# Patient Record
Sex: FEMALE | Race: WHITE | NOT HISPANIC OR LATINO | Employment: FULL TIME | URBAN - METROPOLITAN AREA
[De-identification: names, ages, dates, MRNs, and addresses within clinical notes are randomized per-mention and may not be internally consistent; named-entity substitution may affect disease eponyms.]

---

## 2021-01-04 LAB
EXTERNAL CHLAMYDIA SCREEN: NEGATIVE
EXTERNAL GONORRHEA SCREEN: NEGATIVE

## 2021-01-11 LAB
EXTERNAL ABO GROUPING: NORMAL
EXTERNAL ANTIBODY SCREEN: NORMAL
EXTERNAL HEMOGLOBIN: 14.4 G/DL
EXTERNAL HEPATITIS B SURFACE ANTIGEN: NEGATIVE
EXTERNAL HIV-1/2 AB-AG: NORMAL
EXTERNAL PLATELET COUNT: 271 K/ΜL
EXTERNAL RH FACTOR: POSITIVE
EXTERNAL RUBELLA IGG QUANTITATION: NORMAL
EXTERNAL SYPHILIS RPR SCREEN: NORMAL

## 2021-04-28 RX ORDER — LEVOTHYROXINE SODIUM 50 UG/1
1 CAPSULE ORAL DAILY
COMMUNITY

## 2021-04-29 ENCOUNTER — ROUTINE PRENATAL (OUTPATIENT)
Dept: OBGYN CLINIC | Facility: CLINIC | Age: 31
End: 2021-04-29

## 2021-04-29 VITALS
SYSTOLIC BLOOD PRESSURE: 114 MMHG | HEIGHT: 64 IN | BODY MASS INDEX: 25.44 KG/M2 | DIASTOLIC BLOOD PRESSURE: 74 MMHG | WEIGHT: 149 LBS

## 2021-04-29 DIAGNOSIS — Z34.92 PRENATAL CARE IN SECOND TRIMESTER: ICD-10-CM

## 2021-04-29 DIAGNOSIS — Z3A.24 24 WEEKS GESTATION OF PREGNANCY: Primary | ICD-10-CM

## 2021-04-29 DIAGNOSIS — Z36.0 ENCOUNTER FOR ANTENATAL SCREENING FOR CHROMOSOMAL ANOMALIES: ICD-10-CM

## 2021-04-29 DIAGNOSIS — E03.9 ACQUIRED HYPOTHYROIDISM: ICD-10-CM

## 2021-04-29 LAB
SL AMB  POCT GLUCOSE, UA: NORMAL
SL AMB POCT URINE PROTEIN: NORMAL

## 2021-04-29 PROCEDURE — PNV: Performed by: OBSTETRICS & GYNECOLOGY

## 2021-04-29 NOTE — PROGRESS NOTES
Routine Prenatal Visit  [address] [facility], [address], [facility], [address] [facility]    Assessment/Plan:  Soledad James is a 31y o  year old  at 25w3d who presents for routine prenatal visit  1  24 weeks gestation of pregnancy  -     POCT urine dip    2  Encounter for  screening for chromosomal anomalies  -     Glucose, 1H PG; Future  -     CBC; Future  -     RPR, Rfx Qn RPR/Confirm TP; Future  -     Glucose, 1H PG  -     CBC  -     RPR, Rfx Qn RPR/Confirm TP    3  Prenatal care in second trimester   Pt aware of 20 wk U/S results    4  Acquired hypothyroidism  Assessment & Plan:  Pt managed by Endocrinologist          Subjective:     CC: Prenatal care    Patt Nguyen is a 31 y o   female who presents for routine prenatal care at 24w1d  Pregnancy ROS: No leakage of fluid, pelvic pain, or vaginal bleeding  Nml fetal movement  The following portions of the patient's history were reviewed and updated as appropriate: allergies, current medications, past family history, past medical history, obstetric history, gynecologic history, past social history, past surgical history and problem list       Objective:  /74   Ht 5' 4" (1 626 m)   Wt 67 6 kg (149 lb)   LMP 2020   BMI 25 58 kg/m²   Pregravid Weight/BMI: Pregravid weight not on file (BMI Could not be calculated)  Current Weight: 67 6 kg (149 lb)   Total Weight Gain: Not found  Pre-David Vitals      Most Recent Value   Prenatal Assessment   Fetal Heart Rate  150   Fundal Height (cm)  24 cm   Movement  Present   Prenatal Vitals   Blood Pressure  114/74   Weight - Scale  67 6 kg (149 lb)   Urine Albumin/Glucose   Dilation/Effacement/Station   Vaginal Drainage   Draining Fluid  No   Edema           General: Well appearing, no distress  Abdomen: Soft, gravid, nontender  Fundal Height: Appropriate for gestational age  Extremities: Warm and well perfused  Non tender

## 2021-05-27 ENCOUNTER — ROUTINE PRENATAL (OUTPATIENT)
Dept: OBGYN CLINIC | Facility: CLINIC | Age: 31
End: 2021-05-27

## 2021-05-27 VITALS
SYSTOLIC BLOOD PRESSURE: 100 MMHG | WEIGHT: 154 LBS | BODY MASS INDEX: 26.29 KG/M2 | HEIGHT: 64 IN | DIASTOLIC BLOOD PRESSURE: 60 MMHG

## 2021-05-27 DIAGNOSIS — E03.9 ACQUIRED HYPOTHYROIDISM: ICD-10-CM

## 2021-05-27 DIAGNOSIS — Z3A.28 28 WEEKS GESTATION OF PREGNANCY: Primary | ICD-10-CM

## 2021-05-27 DIAGNOSIS — Z34.93 PRENATAL CARE IN THIRD TRIMESTER: ICD-10-CM

## 2021-05-27 LAB
SL AMB  POCT GLUCOSE, UA: NORMAL
SL AMB POCT URINE PROTEIN: NORMAL

## 2021-05-27 PROCEDURE — PNV: Performed by: OBSTETRICS & GYNECOLOGY

## 2021-05-27 NOTE — PROGRESS NOTES
Routine Prenatal Visit  [address] [facility], [address], [facility], [address] [facility]    Assessment/Plan:  Demetrius Peña is a 31y o  year old  at 29w1d who presents for routine prenatal visit  1  28 weeks gestation of pregnancy  -     POCT urine dip        -   Pt has order for 28 week labs and is scheduled for this week  2  Acquired hypothyroidism    3  Prenatal care in third trimester       -   Pt received 1st dose of COVID-19 vaccine last week  Next OB Visit 2 weeks  Subjective:     CC: Prenatal care    Luis Cade is a 31 y o   female who presents for routine prenatal care at 28w1d  Pregnancy ROS: No leakage of fluid, pelvic pain, or vaginal bleeding  Nml fetal movement  The following portions of the patient's history were reviewed and updated as appropriate: allergies, current medications, past family history, past medical history, obstetric history, gynecologic history, past social history, past surgical history and problem list       Objective:  /60   Ht 5' 4" (1 626 m)   Wt 69 9 kg (154 lb)   LMP 2020   BMI 26 43 kg/m²   Pregravid Weight/BMI: Pregravid weight not on file (BMI Could not be calculated)  Current Weight: 69 9 kg (154 lb)   Total Weight Gain: Not found  Pre-David Vitals      Most Recent Value   Prenatal Assessment   Fetal Heart Rate  140-150   Fundal Height (cm)  28 cm   Movement  Present   Prenatal Vitals   Blood Pressure  100/60   Weight - Scale  69 9 kg (154 lb)   Urine Albumin/Glucose   Dilation/Effacement/Station   Vaginal Drainage   Draining Fluid  No   Edema           General: Well appearing, no distress  Abdomen: Soft, gravid, nontender  Fundal Height: Appropriate for gestational age  Extremities: Warm and well perfused  Non tender

## 2021-06-01 LAB
EXTERNAL HEMOGLOBIN: 13 G/DL
EXTERNAL PLATELET COUNT: 282 K/ΜL
EXTERNAL SYPHILIS RPR SCREEN: NORMAL

## 2021-06-02 LAB
ERYTHROCYTE [DISTWIDTH] IN BLOOD BY AUTOMATED COUNT: 12.1 % (ref 11.7–15.4)
GLUCOSE 1H P 50 G GLC PO SERPL-MCNC: 88 MG/DL (ref 65–139)
HCT VFR BLD AUTO: 37.7 % (ref 34–46.6)
HGB BLD-MCNC: 13 G/DL (ref 11.1–15.9)
MCH RBC QN AUTO: 32.7 PG (ref 26.6–33)
MCHC RBC AUTO-ENTMCNC: 34.5 G/DL (ref 31.5–35.7)
MCV RBC AUTO: 95 FL (ref 79–97)
PLATELET # BLD AUTO: 282 X10E3/UL (ref 150–450)
RBC # BLD AUTO: 3.98 X10E6/UL (ref 3.77–5.28)
RPR SER QL: NON REACTIVE
WBC # BLD AUTO: 8.6 X10E3/UL (ref 3.4–10.8)

## 2021-06-09 ENCOUNTER — ROUTINE PRENATAL (OUTPATIENT)
Dept: OBGYN CLINIC | Facility: CLINIC | Age: 31
End: 2021-06-09

## 2021-06-09 VITALS
SYSTOLIC BLOOD PRESSURE: 114 MMHG | DIASTOLIC BLOOD PRESSURE: 74 MMHG | BODY MASS INDEX: 26.66 KG/M2 | HEIGHT: 65 IN | WEIGHT: 160 LBS

## 2021-06-09 DIAGNOSIS — O26.893 LOW BACK PAIN DURING PREGNANCY IN THIRD TRIMESTER: Primary | ICD-10-CM

## 2021-06-09 DIAGNOSIS — Z3A.30 30 WEEKS GESTATION OF PREGNANCY: ICD-10-CM

## 2021-06-09 DIAGNOSIS — M54.50 LOW BACK PAIN DURING PREGNANCY IN THIRD TRIMESTER: Primary | ICD-10-CM

## 2021-06-09 LAB
SL AMB  POCT GLUCOSE, UA: NORMAL
SL AMB POCT URINE PROTEIN: NORMAL

## 2021-06-09 PROCEDURE — PNV: Performed by: OBSTETRICS & GYNECOLOGY

## 2021-06-09 NOTE — PROGRESS NOTES
Routine Prenatal Visit  [address] [facility], [address], [facility], [address] [facility]    Assessment/Plan:  Taiwo Villarreal is a 27y o  year old  at 30w0d who presents for routine prenatal visit  1  Low back pain during pregnancy in third trimester  Assessment & Plan:  Discussed sciatic pain and tx - stretching, massage, heat and rest       2  30 weeks gestation of pregnancy  -     POCT urine dip        Subjective:     CC: Prenatal care    Yesika Valencia is a 27 y o   female who presents for routine prenatal care at 30w0d  Pregnancy ROS: no leakage of fluid, pelvic pain, or vaginal bleeding  normal fetal movement  The following portions of the patient's history were reviewed and updated as appropriate: allergies, current medications, past family history, past medical history, obstetric history, gynecologic history, past social history, past surgical history and problem list       Objective:  /74   Ht 5' 5" (1 651 m)   Wt 72 6 kg (160 lb)   LMP 2020   BMI 26 63 kg/m²   Pregravid Weight/BMI: Pregravid weight not on file (BMI Could not be calculated)  Current Weight: 72 6 kg (160 lb)   Total Weight Gain: Not found  Pre- Vitals      Most Recent Value   Prenatal Assessment   Fetal Heart Rate  140   Fundal Height (cm)  30 cm   Movement  Present   Presentation  Vertex   Prenatal Vitals   Blood Pressure  114/74   Weight - Scale  72 6 kg (160 lb)   Urine Albumin/Glucose   Dilation/Effacement/Station   Vaginal Drainage   Edema           General: Well appearing, no distress  Respiratory: Unlabored breathing  Cardiovascular: Regular rate  Abdomen: Soft, gravid, nontender  Fundal Height: Appropriate for gestational age  Extremities: Warm and well perfused  Non tender

## 2021-06-14 ENCOUNTER — TELEPHONE (OUTPATIENT)
Dept: OBGYN CLINIC | Facility: CLINIC | Age: 31
End: 2021-06-14

## 2021-06-14 NOTE — TELEPHONE ENCOUNTER
Returned call to patient, advised no specific chiropractor recommendation  Advised if decides to see chiropractor advise of pregnancy and confiirm provider has been trained with OB patients  May also want to consider SI belt or pregnancy support belt  Patient in agreement

## 2021-06-14 NOTE — TELEPHONE ENCOUNTER
Nimco boyle asking for recommendations for chiropractor  Having sciatic pain which is making it difficult to walk

## 2021-06-23 ENCOUNTER — ROUTINE PRENATAL (OUTPATIENT)
Dept: OBGYN CLINIC | Facility: CLINIC | Age: 31
End: 2021-06-23

## 2021-06-23 VITALS
WEIGHT: 158 LBS | DIASTOLIC BLOOD PRESSURE: 64 MMHG | HEIGHT: 64 IN | SYSTOLIC BLOOD PRESSURE: 104 MMHG | BODY MASS INDEX: 26.98 KG/M2

## 2021-06-23 DIAGNOSIS — M54.50 LOW BACK PAIN DURING PREGNANCY IN THIRD TRIMESTER: Primary | ICD-10-CM

## 2021-06-23 DIAGNOSIS — O26.893 LOW BACK PAIN DURING PREGNANCY IN THIRD TRIMESTER: Primary | ICD-10-CM

## 2021-06-23 DIAGNOSIS — E03.9 ACQUIRED HYPOTHYROIDISM: ICD-10-CM

## 2021-06-23 DIAGNOSIS — Z3A.32 32 WEEKS GESTATION OF PREGNANCY: ICD-10-CM

## 2021-06-23 LAB
SL AMB  POCT GLUCOSE, UA: NORMAL
SL AMB POCT URINE PROTEIN: NORMAL

## 2021-06-23 PROCEDURE — PNV: Performed by: OBSTETRICS & GYNECOLOGY

## 2021-06-23 NOTE — PROGRESS NOTES
Routine Prenatal Visit  [address] [facility], [address], [facility], [address] [facility] [address]    Assessment/Plan:  Soledad James is a 27y o  year old  at 32w0d who presents for routine prenatal visit  2nd COVID shot was 6/10/21  Rec waiting until next visit for TDAP    1  Low back pain during pregnancy in third trimester  Assessment & Plan:  Right sided sciatic pain  Using heat with some improvement  Going for massage      2  32 weeks gestation of pregnancy  -     POCT urine dip    3  Acquired hypothyroidism        Subjective:     CC: Prenatal care    Clara Hernandez is a 27 y o   female who presents for routine prenatal care at 32w0d  Pregnancy ROS: no leakage of fluid, pelvic pain, or vaginal bleeding  normal fetal movement  The following portions of the patient's history were reviewed and updated as appropriate: allergies, current medications, past family history, past medical history, obstetric history, gynecologic history, past social history, past surgical history and problem list       Objective:  /64   Ht 5' 4" (1 626 m)   Wt 71 7 kg (158 lb)   LMP 2020   BMI 27 12 kg/m²   Pregravid Weight/BMI: Pregravid weight not on file (BMI Could not be calculated)  Current Weight: 71 7 kg (158 lb)   Total Weight Gain: Not found  Pre- Vitals      Most Recent Value   Prenatal Assessment   Fetal Heart Rate  140   Fundal Height (cm)  32 cm   Movement  Present   Presentation  Vertex   Prenatal Vitals   Blood Pressure  104/64   Weight - Scale  71 7 kg (158 lb)   Urine Albumin/Glucose   Dilation/Effacement/Station   Vaginal Drainage   Draining Fluid  No   Edema   LLE Edema  None   RLE Edema  None           General: Well appearing, no distress  Respiratory: Unlabored breathing  Cardiovascular: Regular rate  Abdomen: Soft, gravid, nontender  Fundal Height: Appropriate for gestational age  Extremities: Warm and well perfused  Non tender

## 2021-07-09 ENCOUNTER — ROUTINE PRENATAL (OUTPATIENT)
Dept: OBGYN CLINIC | Facility: CLINIC | Age: 31
End: 2021-07-09
Payer: COMMERCIAL

## 2021-07-09 VITALS
WEIGHT: 163 LBS | HEIGHT: 64 IN | DIASTOLIC BLOOD PRESSURE: 70 MMHG | BODY MASS INDEX: 27.83 KG/M2 | SYSTOLIC BLOOD PRESSURE: 110 MMHG

## 2021-07-09 DIAGNOSIS — Z23 NEED FOR TDAP VACCINATION: ICD-10-CM

## 2021-07-09 DIAGNOSIS — Z3A.34 34 WEEKS GESTATION OF PREGNANCY: ICD-10-CM

## 2021-07-09 DIAGNOSIS — O99.283 HYPOTHYROID IN PREGNANCY, ANTEPARTUM, THIRD TRIMESTER: Primary | ICD-10-CM

## 2021-07-09 DIAGNOSIS — E03.9 HYPOTHYROID IN PREGNANCY, ANTEPARTUM, THIRD TRIMESTER: Primary | ICD-10-CM

## 2021-07-09 LAB
SL AMB  POCT GLUCOSE, UA: NORMAL
SL AMB POCT URINE PROTEIN: NORMAL

## 2021-07-09 PROCEDURE — PNV: Performed by: STUDENT IN AN ORGANIZED HEALTH CARE EDUCATION/TRAINING PROGRAM

## 2021-07-09 PROCEDURE — 90471 IMMUNIZATION ADMIN: CPT | Performed by: STUDENT IN AN ORGANIZED HEALTH CARE EDUCATION/TRAINING PROGRAM

## 2021-07-09 PROCEDURE — 90715 TDAP VACCINE 7 YRS/> IM: CPT | Performed by: STUDENT IN AN ORGANIZED HEALTH CARE EDUCATION/TRAINING PROGRAM

## 2021-07-09 NOTE — PROGRESS NOTES
Routine Prenatal Visit  [address] [facility], [address], [facility], [address] [facility]    Assessment/Plan:  Yonathan Shay is a 27y o  year old  at 35w2d who presents for routine prenatal visit  1  Hypothyroid in pregnancy, antepartum, third trimester  Assessment & Plan:  - PTL/PPROM/Bleeding precautions given  Kick counts reviewed  - Third trimester labs reviewed  - TDaP administered today  - Reviewed plan for collection of GBS swab at 36 weeks  - Problem list updated  - PMH, PSH, medications reviewed and updated as needed  - Return to office in 2 wk(s) for routine prenatal care        2  34 weeks gestation of pregnancy  -     POCT urine dip    3  Need for Tdap vaccination  -     Tdap Vaccine greater than or equal to 8yo        Subjective:   Lyndon Staples is a 27 y o   who presents for routine prenatal care at 34w2d  Complaints today: No  LOF: No; VB: No; Contractions: No; FM: Present and subjectively normal    Objective:  /70 (BP Location: Left arm, Patient Position: Sitting, Cuff Size: Standard)   Ht 5' 4" (1 626 m)   Wt 73 9 kg (163 lb)   LMP 2020   BMI 27 98 kg/m²     General: Well appearing, no distress  Respiratory: Unlabored breathing  Cardiovascular: Regular rate  Abdomen: Soft, gravid, nontender  Extremities: Warm and well perfused  Non tender  Pregravid Weight/BMI: Pregravid weight not on file (BMI Could not be calculated)  Current Weight: 73 9 kg (163 lb)   Total Weight Gain: Not found       Pre- Vitals      Most Recent Value   Prenatal Assessment   Fetal Heart Rate  140   Fundal Height (cm)  34 cm   Movement  Present   Presentation  Vertex   Prenatal Vitals   Blood Pressure  110/70   Weight - Scale  73 9 kg (163 lb)   Urine Albumin/Glucose   Dilation/Effacement/Station   Vaginal Drainage   Draining Fluid  No   Edema   LLE Edema  None   RLE Edema  None   Facial Edema  None           Gerson Hall MD  2021 3:06 PM

## 2021-07-09 NOTE — PATIENT INSTRUCTIONS
Pregnancy at 31 to 34 Weeks   AMBULATORY CARE:   What changes are happening to your body: You may continue to have symptoms such as shortness of breath, heartburn, contractions, or swelling of your ankles and feet  You may be gaining about 1 pound a week now  Seek care immediately if:   · You develop a severe headache that does not go away  · You have new or increased vision changes, such as blurred or spotted vision  · You have new or increased swelling in your face or hands  · You have vaginal spotting or bleeding  · Your water broke or you feel warm water gushing or trickling from your vagina  Contact your healthcare provider if:   · You have more than 5 contractions in 1 hour  · You notice any changes in your baby's movements  · You have abdominal cramps, pressure, or tightening  · You have a change in vaginal discharge  · You have chills or a fever  · You have vaginal itching, burning, or pain  · You have yellow, green, white, or foul-smelling vaginal discharge  · You have pain or burning when you urinate, less urine than usual, or pink or bloody urine  · You have questions or concerns about your condition or care  How to care for yourself at this stage of your pregnancy:   · Eat a variety of healthy foods  Healthy foods include fruits, vegetables, whole-grain breads, low-fat dairy foods, beans, lean meats, and fish  Drink liquids as directed  Ask how much liquid to drink each day and which liquids are best for you  Limit caffeine to less than 200 milligrams each day  Limit your intake of fish to 2 servings each week  Choose fish low in mercury such as canned light tuna, shrimp, salmon, cod, or tilapia  Do not  eat fish high in mercury such as swordfish, tilefish, alondra mackerel, and shark  · Manage heartburn  by eating 4 or 5 small meals each day instead of large meals  Avoid spicy food  · Manage swelling  by lying down and putting your feet up  · Take prenatal vitamins as directed  Your need for certain vitamins and minerals, such as folic acid, increases during pregnancy  Prenatal vitamins provide some of the extra vitamins and minerals you need  Prenatal vitamins may also help to decrease the risk of certain birth defects  · Talk to your healthcare provider about exercise  Moderate exercise can help you stay fit  Your healthcare provider will help you plan an exercise program that is safe for you during pregnancy  · Do not smoke  Smoking increases your risk of a miscarriage and other health problems during your pregnancy  Smoking can cause your baby to be born too early or weigh less at birth  Ask your healthcare provider for information if you need help quitting  · Do not drink alcohol  Alcohol passes from your body to your baby through the placenta  It can affect your baby's brain development and cause fetal alcohol syndrome (FAS)  FAS is a group of conditions that causes mental, behavior, and growth problems  · Talk to your healthcare provider before you take any medicines  Many medicines may harm your baby if you take them when you are pregnant  Do not take any medicines, vitamins, herbs, or supplements without first talking to your healthcare provider  Never use illegal or street drugs (such as marijuana or cocaine) while you are pregnant  Safety tips during pregnancy:   · Avoid hot tubs and saunas  Do not use a hot tub or sauna while you are pregnant, especially during your first trimester  Hot tubs and saunas may raise your baby's temperature and increase the risk of birth defects  · Avoid toxoplasmosis  This is an infection caused by eating raw meat or being around infected cat feces  It can cause birth defects, miscarriages, and other problems  Wash your hands after you touch raw meat  Make sure any meat is well-cooked before you eat it  Avoid raw eggs and unpasteurized milk   Use gloves or ask someone else to clean your cat's litter box while you are pregnant  Changes that are happening with your baby:  By 34 weeks, your baby may weigh more than 5 pounds  Your baby will be about 12 ½ inches long from the top of the head to the rump (baby's bottom)  Your baby is gaining about ½ pound a week  Your baby's eyes open and close now  Your baby's kicks and movements are more forceful at this time  What you need to know about prenatal care: Your healthcare provider will check your blood pressure and weight  You may also need the following:  · A urine test  may also be done to check for sugar and protein  These can be signs of gestational diabetes or infection  Protein in your urine may also be a sign of preeclampsia  Preeclampsia is a condition that can develop during week 20 or later of your pregnancy  It causes high blood pressure, and it can cause problems with your kidneys and other organs  · A Tdap vaccine  may be recommended by your healthcare provider  · Fundal height  is a measurement of your uterus to check your baby's growth  This number is usually the same as the number of weeks that you have been pregnant  Your healthcare provider may also check your baby's position  · Your baby's heart rate  will be checked  © Copyright [address] Information is for End User's use only and may not be sold, redistributed or otherwise used for commercial purposes  All illustrations and images included in CareNotes® are the copyrighted property of A D A M , Inc  or [facility]   The above information is an  only  It is not intended as medical advice for individual conditions or treatments  Talk to your doctor, nurse or pharmacist before following any medical regimen to see if it is safe and effective for you

## 2021-07-09 NOTE — ASSESSMENT & PLAN NOTE
- PTL/PPROM/Bleeding precautions given  Kick counts reviewed  - Third trimester labs reviewed  - TDaP administered today  - Reviewed plan for collection of GBS swab at 36 weeks    - Problem list updated  - PMH, PSH, medications reviewed and updated as needed  - Return to office in 2 wk(s) for routine prenatal care

## 2021-07-23 ENCOUNTER — ROUTINE PRENATAL (OUTPATIENT)
Dept: OBGYN CLINIC | Facility: CLINIC | Age: 31
End: 2021-07-23

## 2021-07-23 VITALS
WEIGHT: 167.4 LBS | DIASTOLIC BLOOD PRESSURE: 58 MMHG | HEIGHT: 64 IN | SYSTOLIC BLOOD PRESSURE: 90 MMHG | BODY MASS INDEX: 28.58 KG/M2

## 2021-07-23 DIAGNOSIS — Z36.85 ANTENATAL SCREENING FOR STREPTOCOCCUS B: ICD-10-CM

## 2021-07-23 DIAGNOSIS — O99.283 HYPOTHYROID IN PREGNANCY, ANTEPARTUM, THIRD TRIMESTER: Primary | ICD-10-CM

## 2021-07-23 DIAGNOSIS — Z87.19 HISTORY OF ANAL FISSURES: ICD-10-CM

## 2021-07-23 DIAGNOSIS — Z3A.36 36 WEEKS GESTATION OF PREGNANCY: ICD-10-CM

## 2021-07-23 DIAGNOSIS — E03.9 HYPOTHYROID IN PREGNANCY, ANTEPARTUM, THIRD TRIMESTER: Primary | ICD-10-CM

## 2021-07-23 LAB
EXTERNAL GROUP B STREP ANTIGEN: POSITIVE
SL AMB  POCT GLUCOSE, UA: NEGATIVE
SL AMB POCT URINE PROTEIN: NEGATIVE

## 2021-07-23 PROCEDURE — PNV: Performed by: OBSTETRICS & GYNECOLOGY

## 2021-07-23 RX ORDER — FOLIC ACID, .BETA.-CAROTENE, ASCORBIC ACID, CHOLECALCIFEROL, .ALPHA.-TOCOPHEROL ACETATE, DL-, THIAMINE MONONITRATE, RIBOFLAVIN, NIACINAMIDE, PYRIDOXINE HYDROCHLORIDE, CYANOCOBALAMIN, CALCIUM PANTOTHENATE, CALCIUM CARBONATE, FERROUS FUMARATE, AND ZINC OXIDE 1; 1000; 100; 400; 30; 3; 3; 15; 20; 12; 7; 200; 29; 20 MG/1; [IU]/1; MG/1; [IU]/1; [IU]/1; MG/1; MG/1; MG/1; MG/1; UG/1; MG/1; MG/1; MG/1; MG/1
TABLET, CHEWABLE ORAL EVERY 24 HOURS
COMMUNITY

## 2021-07-23 RX ORDER — PSYLLIUM HUSK (WITH SUGAR) 3 G/12 G
POWDER (GRAM) ORAL
COMMUNITY
End: 2021-08-11

## 2021-07-23 NOTE — PATIENT INSTRUCTIONS
- Please call office if leaking of fluid or bleeding   - You may be in labor if you are having regular contractions for > 1 hour (lasting 1 minute, every 5 minutes, becoming more painful for over time, and do not decrease with rest and drinking 2 glasses of water or other fluid)  - Please call if you feel a significant decrease in fetal movement and during fetal kick counts the baby does not move 10 times in 2 hours

## 2021-07-23 NOTE — PROGRESS NOTES
Routine Prenatal Visit  [address] [facility], [address], [facility], [address] [facility] [address]    Assessment/Plan:  Reanna Pollard is a 27y o  year old  at 37w1d who presents for routine prenatal visit  1  Hypothyroid in pregnancy, antepartum, third trimester    2   screening for streptococcus B  -     Strep B DNA probe, amplification    3  36 weeks gestation of pregnancy  -     POCT urine dip    4  History of anal fissures  Comments:  pt states at least 5-10 years ago  No issues in pregnancy  Reassured likely to cause issue or concern with vaginal delivery  Next OB Visit 1 weeks  Subjective:     CC: Prenatal care    Myra Duran is a 27 y o   female who presents for routine prenatal care at 36w2d  Pregnancy ROS: no leakage of fluid, pelvic pain, or vaginal bleeding  normal fetal movement  The following portions of the patient's history were reviewed and updated as appropriate: allergies, current medications, past family history, past medical history, obstetric history, gynecologic history, past social history, past surgical history and problem list       Objective:  BP 90/58   Ht 5' 4" (1 626 m)   Wt 75 9 kg (167 lb 6 4 oz)   LMP 2020   BMI 28 73 kg/m²   Pregravid Weight/BMI: 59 kg (130 lb) (BMI 22 30)  Current Weight: 75 9 kg (167 lb 6 4 oz)   Total Weight Gain: 17 kg (37 lb 6 4 oz)   Pre- Vitals      Most Recent Value   Prenatal Assessment   Fetal Heart Rate  140   Fundal Height (cm)  36 cm   Movement  Present   Presentation  Vertex   Prenatal Vitals   Blood Pressure  90/58   Weight - Scale  75 9 kg (167 lb 6 4 oz)   Urine Albumin/Glucose   Dilation/Effacement/Station   Vaginal Drainage   Edema   LLE Edema  None   RLE Edema  None   Facial Edema  None         TAU/S: confirmed vertex position today    General: Well appearing, no distress  Abdomen: Soft, gravid, nontender  Fundal Height: Appropriate for gestational age  Extremities: Warm and well perfused  Non tender

## 2021-07-25 LAB — GP B STREP DNA SPEC QL NAA+PROBE: POSITIVE

## 2021-08-02 ENCOUNTER — ROUTINE PRENATAL (OUTPATIENT)
Dept: OBGYN CLINIC | Facility: CLINIC | Age: 31
End: 2021-08-02

## 2021-08-02 VITALS
SYSTOLIC BLOOD PRESSURE: 90 MMHG | DIASTOLIC BLOOD PRESSURE: 62 MMHG | BODY MASS INDEX: 28.58 KG/M2 | HEIGHT: 64 IN | WEIGHT: 167.4 LBS

## 2021-08-02 DIAGNOSIS — Z3A.37 37 WEEKS GESTATION OF PREGNANCY: ICD-10-CM

## 2021-08-02 DIAGNOSIS — E03.9 HYPOTHYROID IN PREGNANCY, ANTEPARTUM, THIRD TRIMESTER: Primary | ICD-10-CM

## 2021-08-02 DIAGNOSIS — O26.893 LOW BACK PAIN DURING PREGNANCY IN THIRD TRIMESTER: ICD-10-CM

## 2021-08-02 DIAGNOSIS — B95.1 POSITIVE GBS TEST: ICD-10-CM

## 2021-08-02 DIAGNOSIS — M54.50 LOW BACK PAIN DURING PREGNANCY IN THIRD TRIMESTER: ICD-10-CM

## 2021-08-02 DIAGNOSIS — O99.283 HYPOTHYROID IN PREGNANCY, ANTEPARTUM, THIRD TRIMESTER: Primary | ICD-10-CM

## 2021-08-02 LAB
SL AMB  POCT GLUCOSE, UA: NEGATIVE
SL AMB POCT URINE PROTEIN: NEGATIVE

## 2021-08-02 PROCEDURE — PNV: Performed by: STUDENT IN AN ORGANIZED HEALTH CARE EDUCATION/TRAINING PROGRAM

## 2021-08-02 RX ORDER — LEVOTHYROXINE SODIUM 50 MCG
TABLET ORAL
COMMUNITY
Start: 2021-06-26 | End: 2021-08-11

## 2021-08-02 NOTE — ASSESSMENT & PLAN NOTE
- Labor/Bleeding/ROM precautions given  Kick counts reviewed  - GBS positive result reviewed  Discussed importance of antibiotic ppx, including instruction to call immediately if concern for ROM  - Reviewed timing of delivery, including option for elective induction of labor as early as 39 weeks versus awaiting spontaneous onset of labor  Will plan for assessment of cervical dilation at 39 weeks    - Problem list updated  - PMH, PSH, medications reviewed and updated as needed  - Return to office in 1 wk(s) for routine prenatal care

## 2021-08-02 NOTE — PROGRESS NOTES
Routine Prenatal Visit  [address] [facility], [address], [facility], [address] [facility]    Assessment/Plan:  Karthikeyan Mancilla is a 27y o  year old  at 37w6d who presents for routine prenatal visit  1  Hypothyroid in pregnancy, antepartum, third trimester  Assessment & Plan:  - Labor/Bleeding/ROM precautions given  Kick counts reviewed  - GBS positive result reviewed  Discussed importance of antibiotic ppx, including instruction to call immediately if concern for ROM  - Reviewed timing of delivery, including option for elective induction of labor as early as 39 weeks versus awaiting spontaneous onset of labor  Will plan for assessment of cervical dilation at 39 weeks  - Problem list updated  - PMH, PSH, medications reviewed and updated as needed  - Return to office in 1 wk(s) for routine prenatal care        2  Low back pain during pregnancy in third trimester    3  37 weeks gestation of pregnancy  -     POCT urine dip    4  Positive GBS test        Subjective:   Cierra Snow is a 27 y o   who presents for routine prenatal care at 37w5d  Complaints today: No  LOF: No; VB: No; Contractions: No; FM: Present and normal    Objective:  BP 90/62   Ht 5' 4" (1 626 m)   Wt 75 9 kg (167 lb 6 4 oz)   LMP 2020   BMI 28 73 kg/m²     General: Well appearing, no distress  Respiratory: Unlabored breathing  Cardiovascular: Regular rate  Abdomen: Soft, gravid, nontender  Extremities: Warm and well perfused  Non tender      Pregravid Weight/BMI: 59 kg (130 lb) (BMI 22 30)  Current Weight: 75 9 kg (167 lb 6 4 oz)   Total Weight Gain: 17 kg (37 lb 6 4 oz)     Pre- Vitals      Most Recent Value   Prenatal Assessment   Fetal Heart Rate  150   Fundal Height (cm)  37 cm   Movement  Present   Presentation  Vertex   Prenatal Vitals   Blood Pressure  90/62   Weight - Scale  75 9 kg (167 lb 6 4 oz)   Urine Albumin/Glucose   Dilation/Effacement/Station   Vaginal Drainage   Draining Fluid  No   Edema LLE Edema  Trace   RLE Edema  Trace   Facial Edema  None           Eustacio Saint, MD  8/2/2021 8:57 AM

## 2021-08-11 ENCOUNTER — ROUTINE PRENATAL (OUTPATIENT)
Dept: OBGYN CLINIC | Facility: CLINIC | Age: 31
End: 2021-08-11

## 2021-08-11 VITALS
BODY MASS INDEX: 29.19 KG/M2 | SYSTOLIC BLOOD PRESSURE: 100 MMHG | WEIGHT: 171 LBS | HEIGHT: 64 IN | DIASTOLIC BLOOD PRESSURE: 60 MMHG

## 2021-08-11 DIAGNOSIS — O99.283 HYPOTHYROID IN PREGNANCY, ANTEPARTUM, THIRD TRIMESTER: Primary | ICD-10-CM

## 2021-08-11 DIAGNOSIS — B95.1 POSITIVE GBS TEST: ICD-10-CM

## 2021-08-11 DIAGNOSIS — E03.9 HYPOTHYROID IN PREGNANCY, ANTEPARTUM, THIRD TRIMESTER: Primary | ICD-10-CM

## 2021-08-11 DIAGNOSIS — Z3A.39 39 WEEKS GESTATION OF PREGNANCY: ICD-10-CM

## 2021-08-11 LAB
SL AMB  POCT GLUCOSE, UA: NORMAL
SL AMB POCT URINE PROTEIN: NORMAL

## 2021-08-11 PROCEDURE — PNV: Performed by: STUDENT IN AN ORGANIZED HEALTH CARE EDUCATION/TRAINING PROGRAM

## 2021-08-11 NOTE — ASSESSMENT & PLAN NOTE
- Labor/Bleeding/ROM precautions given  Kick counts reviewed  - Reviewed timing of delivery, including option for elective induction of labor as early as 44 weeks versus awaiting spontaneous onset of labor  Patient desires to await spontaneous labor   Will return in 1 week for re-evaluation    - Problem list updated  - PMH, PSH, medications reviewed and updated as needed  - Return to office in 1wk(s) for routine prenatal care

## 2021-08-11 NOTE — PROGRESS NOTES
Routine Prenatal Visit  [address] [facility], [address], [facility], [address] [facility] [address]    Assessment/Plan:  [facility] is a 27y o  year old  at 39w0d who presents for routine prenatal visit  1  Hypothyroid in pregnancy, antepartum, third trimester  Assessment & Plan:  - Labor/Bleeding/ROM precautions given  Kick counts reviewed  - Reviewed timing of delivery, including option for elective induction of labor as early as 44 weeks versus awaiting spontaneous onset of labor  Patient desires to await spontaneous labor  Will return in 1 week for re-evaluation    - Problem list updated  - PMH, PSH, medications reviewed and updated as needed  - Return to office in 1wk(s) for routine prenatal care        2  39 weeks gestation of pregnancy  -     POCT urine dip    3  Positive GBS test        Subjective:   Catarino Virgen is a 27 y o   who presents for routine prenatal care at 39w0d  Complaints today: No  LOF: No; VB: No; Contractions: No; FM: present and normal    Objective:  /60 (BP Location: Left arm, Patient Position: Sitting, Cuff Size: Standard)   Ht 5' 4" (1 626 m)   Wt 77 6 kg (171 lb)   LMP 2020   BMI 29 35 kg/m²     General: Well appearing, no distress  Respiratory: Unlabored breathing  Cardiovascular: Regular rate  Abdomen: Soft, gravid, nontender  Extremities: Warm and well perfused  Non tender      Pregravid Weight/BMI: 59 kg (130 lb) (BMI 22 30)  Current Weight: 77 6 kg (171 lb)   Total Weight Gain: 18 6 kg (41 lb)     Pre-David Vitals      Most Recent Value   Prenatal Assessment   Fetal Heart Rate  130   Fundal Height (cm)  39 cm   Movement  Present   Presentation  Vertex   Prenatal Vitals   Blood Pressure  100/60   Weight - Scale  77 6 kg (171 lb)   Urine Albumin/Glucose   Dilation/Effacement/Station   Cervical Dilation  1 5   Cervical Effacement  70   Fetal Station  -2   Vaginal Drainage   Draining Fluid  No   Edema   LLE Edema  None   RLE Edema  None   Facial Edema  None           Darinel Swanson MD  8/11/2021 4:28 PM

## 2021-08-17 ENCOUNTER — ROUTINE PRENATAL (OUTPATIENT)
Dept: OBGYN CLINIC | Facility: CLINIC | Age: 31
End: 2021-08-17

## 2021-08-17 VITALS — SYSTOLIC BLOOD PRESSURE: 100 MMHG | DIASTOLIC BLOOD PRESSURE: 68 MMHG | BODY MASS INDEX: 28.87 KG/M2 | WEIGHT: 168.2 LBS

## 2021-08-17 DIAGNOSIS — Z3A.39 39 WEEKS GESTATION OF PREGNANCY: ICD-10-CM

## 2021-08-17 DIAGNOSIS — O99.283 HYPOTHYROID IN PREGNANCY, ANTEPARTUM, THIRD TRIMESTER: Primary | ICD-10-CM

## 2021-08-17 DIAGNOSIS — E03.9 HYPOTHYROID IN PREGNANCY, ANTEPARTUM, THIRD TRIMESTER: Primary | ICD-10-CM

## 2021-08-17 DIAGNOSIS — O99.820 GBS (GROUP B STREPTOCOCCUS CARRIER), +RV CULTURE, CURRENTLY PREGNANT: ICD-10-CM

## 2021-08-17 LAB
SL AMB  POCT GLUCOSE, UA: NEGATIVE
SL AMB POCT URINE PROTEIN: NEGATIVE

## 2021-08-17 PROCEDURE — PNV: Performed by: OBSTETRICS & GYNECOLOGY

## 2021-08-17 NOTE — PROGRESS NOTES
Routine Prenatal Visit  [address] [facility], [address], [facility], [address] [facility]    Assessment/Plan:  Jd Collins is a 27y o  year old  at 37w11d who presents for routine prenatal visit  Cervix favorable  Labor induction scheduled for  @ 0700 (41w)    1  Hypothyroid in pregnancy, antepartum, third trimester    2  GBS (group B Streptococcus carrier), +RV culture, currently pregnant    3  39 weeks gestation of pregnancy  -     POCT urine dip          Subjective:     CC: Prenatal care    Yokasta Roberts is a 27 y o   female who presents for routine prenatal care at 39w6d  Pregnancy ROS: no leakage of fluid, pelvic pain, or vaginal bleeding  normal fetal movement  The following portions of the patient's history were reviewed and updated as appropriate: allergies, current medications, past family history, past medical history, obstetric history, gynecologic history, past social history, past surgical history and problem list       Objective:  /68   Wt 76 3 kg (168 lb 3 2 oz)   LMP 2020   BMI 28 87 kg/m²   Pregravid Weight/BMI: 59 kg (130 lb) (BMI 22 30)  Current Weight: 76 3 kg (168 lb 3 2 oz)   Total Weight Gain: 17 3 kg (38 lb 3 2 oz)   Pre-David Vitals      Most Recent Value   Prenatal Assessment   Fetal Heart Rate  135   Fundal Height (cm)  40 cm   Movement  Present   Presentation  Vertex   Prenatal Vitals   Blood Pressure  100/68   Weight - Scale  76 3 kg (168 lb 3 2 oz)   Urine Albumin/Glucose   Dilation/Effacement/Station   Cervical Dilation  2   Cervical Effacement  90   Fetal Station  -1   Vaginal Drainage   Draining Fluid  No   Edema           General: Well appearing, no distress  Respiratory: Unlabored breathing  Cardiovascular: Regular rate  Abdomen: Soft, gravid, nontender  Fundal Height: Appropriate for gestational age  Extremities: Warm and well perfused  Non tender

## 2021-08-19 ENCOUNTER — DOCUMENTATION (OUTPATIENT)
Dept: OBGYN CLINIC | Facility: CLINIC | Age: 31
End: 2021-08-19

## 2021-08-19 NOTE — PROGRESS NOTES
Angella Pac presented in labor to [facility] early on 8/19/2021  She received and epidural and was augmented  She progressed to complete and pushed for 2 hours  Vacuum assistance was attempted and unsuccessful  She delivered by LTCS  Baby boy

## 2021-09-03 ENCOUNTER — TELEPHONE (OUTPATIENT)
Dept: OBGYN CLINIC | Facility: CLINIC | Age: 31
End: 2021-09-03

## 2021-09-03 DIAGNOSIS — N76.0 ACUTE VAGINITIS: Primary | ICD-10-CM

## 2021-09-03 NOTE — TELEPHONE ENCOUNTER
Pt delivered via  on 21 and currently breast feeding  Pt reports she was seen on Tuesday by her PCP and treated with Amoxicillin for UTI  Pt feel she has a yeast infection and c/o vaginal itching and irritation  Pt reports she still has light bleeding  Pt called to inquire if she can have a prescription called to her pharmacy

## 2021-09-03 NOTE — TELEPHONE ENCOUNTER
Terazol 7 one applicator at bedtime for  7 nights  Disp one w  One refill sent    Open to air   Aquaphor prn

## 2021-09-03 NOTE — TELEPHONE ENCOUNTER
Spoke with pt and informed a prescription has been sent to [facility] for Terazol 7 one applicator at bedtime x 7 nights, may use aquaphor externally and if symptoms do not improve, call back and schedule f/u appointment  Pt is wearing pads daily, advised open to air as much as possible

## 2021-10-04 ENCOUNTER — POSTPARTUM VISIT (OUTPATIENT)
Dept: OBGYN CLINIC | Facility: CLINIC | Age: 31
End: 2021-10-04

## 2021-10-04 VITALS
DIASTOLIC BLOOD PRESSURE: 60 MMHG | SYSTOLIC BLOOD PRESSURE: 100 MMHG | BODY MASS INDEX: 25.83 KG/M2 | WEIGHT: 145.8 LBS | HEIGHT: 63 IN

## 2021-10-04 PROCEDURE — 99024 POSTOP FOLLOW-UP VISIT: CPT | Performed by: OBSTETRICS & GYNECOLOGY

## 2021-10-04 RX ORDER — FERROUS SULFATE 325(65) MG
1 TABLET ORAL EVERY 24 HOURS
COMMUNITY
End: 2022-03-07

## 2021-10-04 RX ORDER — IBUPROFEN 200 MG
2 TABLET ORAL AS NEEDED
COMMUNITY
End: 2022-03-07

## 2021-10-04 RX ORDER — DOCUSATE SODIUM 100 MG/1
1 CAPSULE, LIQUID FILLED ORAL 2 TIMES DAILY
COMMUNITY

## 2021-10-20 ENCOUNTER — POSTPARTUM VISIT (OUTPATIENT)
Dept: OBGYN CLINIC | Facility: CLINIC | Age: 31
End: 2021-10-20
Payer: COMMERCIAL

## 2021-10-20 VITALS
SYSTOLIC BLOOD PRESSURE: 110 MMHG | DIASTOLIC BLOOD PRESSURE: 64 MMHG | HEIGHT: 64 IN | WEIGHT: 145 LBS | BODY MASS INDEX: 24.75 KG/M2

## 2021-10-20 DIAGNOSIS — N81.10 PELVIC ORGAN PROLAPSE QUANTIFICATION STAGE 1 CYSTOCELE: Primary | ICD-10-CM

## 2021-10-20 PROCEDURE — 99213 OFFICE O/P EST LOW 20 MIN: CPT | Performed by: STUDENT IN AN ORGANIZED HEALTH CARE EDUCATION/TRAINING PROGRAM

## 2021-11-20 LAB
ERYTHROCYTE [DISTWIDTH] IN BLOOD BY AUTOMATED COUNT: 11.5 % (ref 11.7–15.4)
HCT VFR BLD AUTO: 41.1 % (ref 34–46.6)
HGB BLD-MCNC: 14 G/DL (ref 11.1–15.9)
MCH RBC QN AUTO: 30 PG (ref 26.6–33)
MCHC RBC AUTO-ENTMCNC: 34.1 G/DL (ref 31.5–35.7)
MCV RBC AUTO: 88 FL (ref 79–97)
PLATELET # BLD AUTO: 260 X10E3/UL (ref 150–450)
RBC # BLD AUTO: 4.66 X10E6/UL (ref 3.77–5.28)
WBC # BLD AUTO: 5.3 X10E3/UL (ref 3.4–10.8)

## 2021-12-29 ENCOUNTER — TELEPHONE (OUTPATIENT)
Dept: OBGYN CLINIC | Facility: CLINIC | Age: 31
End: 2021-12-29

## 2022-03-07 ENCOUNTER — ANNUAL EXAM (OUTPATIENT)
Dept: OBGYN CLINIC | Facility: CLINIC | Age: 32
End: 2022-03-07
Payer: COMMERCIAL

## 2022-03-07 VITALS
WEIGHT: 137.4 LBS | BODY MASS INDEX: 24.34 KG/M2 | SYSTOLIC BLOOD PRESSURE: 88 MMHG | DIASTOLIC BLOOD PRESSURE: 50 MMHG | HEIGHT: 63 IN

## 2022-03-07 DIAGNOSIS — N81.10 PELVIC ORGAN PROLAPSE QUANTIFICATION STAGE 1 CYSTOCELE: ICD-10-CM

## 2022-03-07 DIAGNOSIS — Z01.419 WOMEN'S ANNUAL ROUTINE GYNECOLOGICAL EXAMINATION: Primary | ICD-10-CM

## 2022-03-07 PROBLEM — M54.50 LOW BACK PAIN DURING PREGNANCY IN THIRD TRIMESTER: Status: RESOLVED | Noted: 2021-06-23 | Resolved: 2022-03-07

## 2022-03-07 PROBLEM — O99.820 GBS (GROUP B STREPTOCOCCUS CARRIER), +RV CULTURE, CURRENTLY PREGNANT: Status: RESOLVED | Noted: 2021-08-17 | Resolved: 2022-03-07

## 2022-03-07 PROBLEM — O26.893 LOW BACK PAIN DURING PREGNANCY IN THIRD TRIMESTER: Status: RESOLVED | Noted: 2021-06-23 | Resolved: 2022-03-07

## 2022-03-07 PROBLEM — E03.9 HYPOTHYROID IN PREGNANCY, ANTEPARTUM, THIRD TRIMESTER: Status: RESOLVED | Noted: 2021-04-29 | Resolved: 2022-03-07

## 2022-03-07 PROBLEM — O99.283 HYPOTHYROID IN PREGNANCY, ANTEPARTUM, THIRD TRIMESTER: Status: RESOLVED | Noted: 2021-04-29 | Resolved: 2022-03-07

## 2022-03-07 PROCEDURE — S0612 ANNUAL GYNECOLOGICAL EXAMINA: HCPCS | Performed by: STUDENT IN AN ORGANIZED HEALTH CARE EDUCATION/TRAINING PROGRAM

## 2022-03-07 RX ORDER — PSYLLIUM HUSK (WITH SUGAR) 3 G/12 G
POWDER (GRAM) ORAL DAILY
COMMUNITY

## 2022-03-07 NOTE — PROGRESS NOTES
[address]   [address] [place] [address], [address]    ASSESSMENT/PLAN: Skyler Rubin is a 27 y o  Jarvis Imperial who presents for annual gynecologic exam     Encounter for routine gynecologic examination  - Routine well woman exam completed today  - Cervical Cancer Screening: Current ASCCP Guidelines reviewed  Last Pap: 02/17/2020  History of abnormal: Remote hx, had colpo, returned to normal  No excisional procedures  - HPV Vaccination status: Immunization series complete  - STI screening offered including HIV testing: offered, pt declined  - Contraceptive counseling discussed  Current contraception: condoms  Declines all other methods  Reviewed recommendation for minimum of 9 months between delivery and conception    - The following were reviewed in today's visit: breast self exam, family planning choices, exercise and healthy diet    Additional problems addressed during this visit:  1  Women's annual routine gynecological examination    2  Pelvic organ prolapse quantification stage 1 cystocele  Assessment & Plan:  - Seeing pelvic floor PT and has noted improvement in symptoms  Will continue  CC:  Annual Gynecologic Examination    HPI: Skyler Rubin is a 27 y o  Jarvis Imperial who presents for annual gynecologic examination  ROS: Negative except as noted in HPI    No LMP recorded  Menstrual History  Period Duration (Days): Amenorrheic in setting of breastfeeding  She  reports being sexually active and has had partner(s) who are male  She reports using the following method of birth control/protection: Condom Male    Sexual History  Sexual Assault: No  Sexually Transmitted Infection History:  (HPV)  Multiple Sex Partners: No  Is Patient in a Monogamous Relationship?: Yes    The following portions of the patient's history were reviewed and updated as appropriate:   Past Medical History:   Diagnosis Date    Hypothyroid     IBS (irritable bowel syndrome)      Past Surgical History: Procedure Laterality Date     SECTION  2021     Family History   Problem Relation Age of Onset    Hypertension Mother     Thyroid disease Mother     Colon cancer Maternal Grandmother     Coronary artery disease Maternal Grandfather     Ovarian cancer Paternal Aunt 36    Diabetes Paternal Aunt     Breast cancer Neg Hx     Uterine cancer Neg Hx      Social History     Socioeconomic History    Marital status: /Civil Union     Spouse name: None    Number of children: None    Years of education: None    Highest education level: Master's degree (e g , MA, MS, Monica, MEd, MSW, JUAN)   Occupational History    Occupation: [employer]   Tobacco Use    Smoking status: Former Smoker     Quit date:      Years since quittin 1    Smokeless tobacco: Never Used   Vaping Use    Vaping Use: Never used   Substance and Sexual Activity    Alcohol use: Yes     Comment: socially    Drug use: Never     Comment: Denies    Sexual activity: Yes     Partners: Male     Birth control/protection: Condom Male     Comment: no new partners in past year   Other Topics Concern    None   Social History Narrative    History of drug/alcohol abuse denies    Sexual Activity sexually active, no new partners in last year    Exercise 3x per week      Tobacco Use/Smoking Are you a former smoker    Exercise: up to 5 times a week or more    Domestic violence: No     Social Determinants of Health     Financial Resource Strain: Not on file   Food Insecurity: Not on file   Transportation Needs: Not on file   Physical Activity: Not on file   Stress: Not on file   Social Connections: Not on file   Intimate Partner Violence: Not on file   Housing Stability: Not on file     Outpatient Medications Marked as Taking for the 3/7/22 encounter (Annual Exam) with Seema Ayoub MD   Medication    docusate sodium (Colace) 100 mg capsule    Levothyroxine Sodium 50 MCG CAPS    Prenatal Vit-Fe Fumarate-FA (Prenatal 23) 29-1 MG CHEW    Psyllium (Fiber) 28 3 % POWD     Allergies   Allergen Reactions    Lactose - Food Allergy Other (See Comments)     unknown           Objective:  BP (!) 88/50 (BP Location: Left arm, Patient Position: Sitting, Cuff Size: Standard)   Ht 5' 3 25" (1 607 m)   Wt 62 3 kg (137 lb 6 4 oz)   Breastfeeding Yes   BMI 24 15 kg/m²        Chaperone present? Yes: Emily Mederos MA  General Appearance: alert and oriented, in no acute distress  Neck/Thyroid: No thyromegaly, no thyroid nodules  Respiratory: Unlabored breathing  Cardiovascular: Regular rate, no peripheral edema  Abdomen: Soft, non-tender, non-distended, no masses, no rebound or guarding  Breast Exam: No dimpling, nipple retraction or discharge  No lumps or masses  No axillary or supraclavicular nodes  Pelvic:       External genitalia: Normal appearance, no abnormal pigmentation, no lesions or masses  Normal Bartholin's and Bryson's  Urinary system: Urethral meatus normal, bladder non-tender  Vaginal: normal mucosa without lesions  Stage 1 prolapse of anterior and apical compartments  Normal-appearing physiologic discharge  Cervix: Normal-appearing, well-epithelialized, no gross lesions or masses  No cervical motion tenderness  Adnexa: No adnexal masses or tenderness noted  Uterus: Normal-sized, regular contour, midline, mobile, no uterine tenderness  Extremities: Normal range of motion  Warm, well-perfused, non-tender     Skin: normal, no rash or abnormalities  Neurologic: alert, oriented x3  Psychiatric: Appropriate affect, mood stable, cooperative with exam         Orlando Licona MD  3/7/2022 10:02 AM

## 2022-10-03 ENCOUNTER — TELEPHONE (OUTPATIENT)
Dept: OBGYN CLINIC | Facility: CLINIC | Age: 32
End: 2022-10-03

## 2022-10-03 NOTE — TELEPHONE ENCOUNTER
Pt called and left v/m stating she thinks she has a yeast infection  Called and spoke with pt, c/o white discharge and itching, has had previous yeast infections  She is nursing  Recommended she start with monistat 7  If symptoms worsen or are not resolved by completion encouraged pt to call back  Patient verbalized understanding

## 2023-04-24 ENCOUNTER — ANNUAL EXAM (OUTPATIENT)
Dept: OBGYN CLINIC | Facility: CLINIC | Age: 33
End: 2023-04-24

## 2023-04-24 VITALS
BODY MASS INDEX: 22.71 KG/M2 | SYSTOLIC BLOOD PRESSURE: 86 MMHG | HEIGHT: 64 IN | WEIGHT: 133 LBS | DIASTOLIC BLOOD PRESSURE: 54 MMHG

## 2023-04-24 DIAGNOSIS — Z01.419 WOMEN'S ANNUAL ROUTINE GYNECOLOGICAL EXAMINATION: Primary | ICD-10-CM

## 2023-04-24 DIAGNOSIS — Z12.4 SCREENING FOR CERVICAL CANCER: ICD-10-CM

## 2023-04-24 RX ORDER — IBUPROFEN 200 MG
TABLET ORAL AS NEEDED
COMMUNITY
End: 2023-04-24

## 2023-04-24 RX ORDER — PHENOL 1.4 %
AEROSOL, SPRAY (ML) MUCOUS MEMBRANE
COMMUNITY

## 2023-04-24 NOTE — PROGRESS NOTES
[id number]   [address] [place] [address], [address]    ASSESSMENT/PLAN: Lisa Darnell is a 34 y o  Sandy Davenport who presents for annual gynecologic exam     Encounter for routine gynecologic examination  - Routine well woman exam completed today  - Cervical Cancer Screening: Current ASCCP Guidelines reviewed  Last Pap: 02/17/2020  History of abnormal: History +HRHPV, returned to normal  Repeat pap collected today, per patient request    - HPV Vaccination status: Immunization series complete  - STI screening offered including HIV testing: offered, pt declined  - Contraceptive counseling discussed  Current contraception: condoms, satisfied  Considering conception in the next year  - The following were reviewed in today's visit: breast self exam, exercise and healthy diet    Additional problems addressed during this visit:  1  Women's annual routine gynecological examination    2  Screening for cervical cancer  -     IGP, Aptima HPV, Rfx 16/18,45        CC:  Annual Gynecologic Examination    HPI: Lisa Darnell is a 34 y o  Sandy Davenport who presents for annual gynecologic examination  She is without complaint today  ROS: Negative except as noted in HPI    Patient's last menstrual period was 04/10/2023 (exact date)  Menstrual History  Period Cycle (Days): 30  Period Duration (Days): 5  Period Pattern: Regular  Menstrual Flow: Moderate  Menstrual Control: Tampon, Maxi pad  Menstrual Control Change Freq (Hours): 6  Dysmenorrhea: (!) Moderate  Dysmenorrhea Symptoms: Cramping    She  reports being sexually active and has had partner(s) who are male  She reports using the following method of birth control/protection: Condom Male    Sexual History  Sexual Assault: No  Sexually Transmitted Infection History: None (History of HPV)  Multiple Sex Partners: No  Is Patient in a Monogamous Relationship?: Yes    The following portions of the patient's history were reviewed and updated as appropriate: Past Medical History:   Diagnosis Date   • Hypothyroid    • IBS (irritable bowel syndrome)      Past Surgical History:   Procedure Laterality Date   •  SECTION  2021     Family History   Problem Relation Age of Onset   • Hypertension Mother    • Thyroid disease Mother    • Melanoma Father    • No Known Problems Brother    • No Known Problems Son    • Colon cancer Maternal Grandmother    • Coronary artery disease Maternal Grandfather    • Brain cancer Paternal Grandmother    • Dementia Paternal Grandfather    • Ovarian cancer Paternal Aunt 44   • Diabetes Paternal Aunt    • Breast cancer Neg Hx    • Uterine cancer Neg Hx      Social History     Socioeconomic History   • Marital status: /Civil Union     Spouse name: None   • Number of children: None   • Years of education: None   • Highest education level: Master's degree (e g , MA, MS, Monica, MEd, MSW, JUAN)   Occupational History   • Occupation: [employer]   Tobacco Use   • Smoking status: Former     Types: Cigarettes     Quit date:      Years since quittin 3   • Smokeless tobacco: Never   Vaping Use   • Vaping Use: Never used   Substance and Sexual Activity   • Alcohol use: Yes     Comment: socially   • Drug use: Never     Comment: Denies   • Sexual activity: Yes     Partners: Male     Birth control/protection: Condom Male     Comment: no new partners in past year   Other Topics Concern   • None   Social History Narrative    History of drug/alcohol abuse denies    Sexual Activity sexually active, no new partners in last year    Exercise 3x per week      Tobacco Use/Smoking Are you a former smoker    Exercise: up to 5 times a week or more    Domestic violence: No     Social Determinants of Health     Financial Resource Strain: Not on file   Food Insecurity: Not on file   Transportation Needs: Not on file   Physical Activity: Not on file   Stress: Not on file   Social Connections: Not on file   Intimate Partner Violence: Not on file "  Housing Stability: Not on file     Outpatient Medications Marked as Taking for the 4/24/23 encounter (Annual Exam) with Maria Victoria Fernandes MD   Medication   • Levothyroxine Sodium 50 MCG CAPS   • Multiple Vitamins-Minerals (Multivitamin Women) TABS   • Psyllium (Fiber) 28 3 % POWD   • [DISCONTINUED] ibuprofen (MOTRIN) 200 mg tablet     Allergies   Allergen Reactions   • Lactose - Food Allergy Other (See Comments)     unknown           Objective:  BP (!) 86/54 (BP Location: Left arm, Patient Position: Sitting, Cuff Size: Standard)   Ht 5' 3 5\" (1 613 m)   Wt 60 3 kg (133 lb)   LMP 04/10/2023 (Exact Date)   Breastfeeding No   BMI 23 19 kg/m²        Chaperone present? Yes: Godfrey Lopez MA  General Appearance: alert and oriented, in no acute distress  Neck/Thyroid: No thyromegaly, no thyroid nodules  Respiratory: Unlabored breathing  Cardiovascular: Regular rate, no peripheral edema  Abdomen: Soft, non-tender, non-distended, no masses, no rebound or guarding  Breast Exam: No dimpling, nipple retraction or discharge  No lumps or masses  No axillary or supraclavicular nodes  Pelvic:       External genitalia: Normal appearance, no abnormal pigmentation, no lesions or masses  Normal Bartholin's and Hoonah's  Urinary system: Urethral meatus normal, bladder non-tender  Vaginal: normal mucosa without prolapse or lesions  Normal-appearing physiologic discharge  Cervix: Normal-appearing, well-epithelialized, no gross lesions or masses  No cervical motion tenderness  Adnexa: No adnexal masses or tenderness noted  Uterus: Normal-sized, regular contour, midline, mobile, no uterine tenderness  Extremities: Normal range of motion  Warm, well-perfused, non-tender     Skin: normal, no rash or abnormalities  Neurologic: alert, oriented x3  Psychiatric: Appropriate affect, mood stable, cooperative with exam         Herve Scales MD  4/24/2023 2:16 PM  "

## 2023-04-27 LAB
CYTOLOGIST CVX/VAG CYTO: NORMAL
DX ICD CODE: NORMAL
HPV GENOTYPE REFLEX: NORMAL
HPV I/H RISK 4 DNA CVX QL PROBE+SIG AMP: NEGATIVE
Lab: NORMAL
OTHER STN SPEC: NORMAL
PATH REPORT.FINAL DX SPEC: NORMAL
SL AMB NOTE:: NORMAL
SL AMB SPECIMEN ADEQUACY: NORMAL
SL AMB TEST METHODOLOGY: NORMAL

## 2023-09-07 ENCOUNTER — TELEPHONE (OUTPATIENT)
Dept: OBGYN CLINIC | Facility: CLINIC | Age: 33
End: 2023-09-07

## 2023-09-07 NOTE — TELEPHONE ENCOUNTER
Pt has a First PN appointment scheduled for 9/20/2023. Pt left a message informing she has been nauseous and dealing with morning sickness. Pt recalls taking  a Vitamin with her last pregnancy and called to question what vitamin to take. Left a message for patient with recommendation to take Vit B6 25mg 3 xday, may add Unisom 12.5mg at night (unisom may cause drowsiness, push po fluids, recommend to try 5-6  small meals throughout the day to keep stomach full, incorporate protein with meals or snacks, try bland foods like plain crackers, toast, broth/soups, push po fluids, sips of gingerale, kristofer or peppermint hard candies are natural treatments for nausea, preggie pops with Vitamin B6. If patient needs any further assistance, please call back.

## 2023-09-20 ENCOUNTER — INITIAL PRENATAL (OUTPATIENT)
Dept: OBGYN CLINIC | Facility: CLINIC | Age: 33
End: 2023-09-20

## 2023-09-20 ENCOUNTER — PATIENT OUTREACH (OUTPATIENT)
Dept: OBGYN CLINIC | Facility: CLINIC | Age: 33
End: 2023-09-20

## 2023-09-20 ENCOUNTER — INITIAL PRENATAL (OUTPATIENT)
Dept: OBGYN CLINIC | Facility: CLINIC | Age: 33
End: 2023-09-20
Payer: COMMERCIAL

## 2023-09-20 VITALS
BODY MASS INDEX: 22.53 KG/M2 | HEIGHT: 64 IN | WEIGHT: 132 LBS | DIASTOLIC BLOOD PRESSURE: 70 MMHG | SYSTOLIC BLOOD PRESSURE: 120 MMHG

## 2023-09-20 DIAGNOSIS — E03.9 HYPOTHYROID IN PREGNANCY, ANTEPARTUM: ICD-10-CM

## 2023-09-20 DIAGNOSIS — Z36.87 UNSURE OF LMP (LAST MENSTRUAL PERIOD) AS REASON FOR ULTRASOUND SCAN: ICD-10-CM

## 2023-09-20 DIAGNOSIS — O34.211 MATERNAL CARE DUE TO LOW TRANSVERSE UTERINE SCAR FROM PREVIOUS CESAREAN DELIVERY: Primary | ICD-10-CM

## 2023-09-20 DIAGNOSIS — O99.280 HYPOTHYROID IN PREGNANCY, ANTEPARTUM: ICD-10-CM

## 2023-09-20 DIAGNOSIS — Z36.89 ENCOUNTER FOR OTHER SPECIFIED ANTENATAL SCREENING: Primary | ICD-10-CM

## 2023-09-20 DIAGNOSIS — Z3A.08 8 WEEKS GESTATION OF PREGNANCY: ICD-10-CM

## 2023-09-20 DIAGNOSIS — Z98.891 HISTORY OF LOW TRANSVERSE CESAREAN SECTION: ICD-10-CM

## 2023-09-20 LAB
SL AMB  POCT GLUCOSE, UA: NORMAL
SL AMB POCT URINE PROTEIN: NORMAL

## 2023-09-20 PROCEDURE — PNV: Performed by: OBSTETRICS & GYNECOLOGY

## 2023-09-20 PROCEDURE — 76817 TRANSVAGINAL US OBSTETRIC: CPT | Performed by: OBSTETRICS & GYNECOLOGY

## 2023-09-20 NOTE — PROGRESS NOTES
OB INTAKE INTERVIEW  Patient is 28 y. o.who presents for OB intake at 8.2 wks  She is accompanied by:    The father of her baby Agnieszka Gross is  involved in the pregnancy. This is a planned and desired pregnancy.   Last Menstrual Period: 2023  Ultrasound: Measured  8 weeks 2  days on   2023  Estimated Date of Delivery: 2024 change by ultrasound. Signs/Symptoms of Pregnancy  Current pregnancy symptoms:   Nausea-OTC and dietary recommendations provided   Unisom/B6 combination recommended   Constipation no  Headaches no  Cramping/spotting no  PICA cravings no    Diabetes-  There is no height or weight on file to calculate BMI. If patient has 1 or more, please order early 1 hour GTT  History of GDM no  BMI >30 no  History of PCOS or current metformin use no  History of LGA/macrosomic infant (4000g/9lbs) no    If patient has 2 or more, please order early 1 hour GTT  AMA no  First degree relative with type 2 diabetes no  History of chronic HTN, hyperlipidemia, elevated A1C no  High risk race (, , ,  or ) no    Hypertension- if you answer yes, please order preeclampsia labs (cbc, comprehensive metabolic panel, urine protein creatinine ratio, uric acid)  History of of chronic HTN no  History of gestational HTN no  History of preeclampsia, eclampsia, or HELLP syndrome no  History of diabetes no  History of lupus, autoimmune disease, kidney disease, antiphospholipid syndrome, rheumatoid arthritis, sjogren's syndrome no    Thyroid- if yes order TSH with reflex T4 (Unless TSH value on file within last 4 weeks then do not need to order)  History of thyroid disease YES, Hypothyroid     Bleeding Disorder or Hx of DVT-patient or first degree relative with history of. Order the following if not done previously.    (Factor V, antithrombin III, prothrombin gene mutation, protein C and S Ag, lupus anticoagulant, anticardiolipin, beta-2 glycoprotein)   no    OB/GYN-  History of abnormal pap smear YES  History of HPV no  History of Herpes/HSV no  History of other STI (gonorrhea, chlamydia, trich) (or current partner with Hep B, Hep C, or HIV) no  History of prior  no  History of prior  YES, x1 2021  History of  delivery prior to 36 weeks 6 days no  History of blood transfusion no  Ok for blood transfusion     Substance screening-   History of tobacco use no  Currently using tobacco no  Currently using alcohol no  Presently using drugs no  Past drug use (if yes, order urine drug screening panel)  no  IV drug use (if yes, order urine drug screening panel) no  Partner drug use (if yes, order urine drug screening panel) no  Parent/Family drug use (do not order urine drug screening panel just for family hx) no    Depression Screening-  PHQ-9 Score: (4)  Screening score related to feeling tired and morning sickness with knowledge of pregnancy    MRSA Screening-   Does the pt have a hx of MRSA? no  If yes- please follow MRSA protocol and obtain a nasal swab for MRSA culture at 12 week visit. Immunizations:  Influenza vaccine given this season (ask date) Provided recommendation with pregnancy    Discussed Tdap vaccine:  Yes   Discussed COVID Vaccine: SEE [facility]     Genetic/[facility]-  Do you or your partner have a history of any of the following in yourselves or first degree relatives? Cystic fibrosis no  Spinal muscular atrophy no  Hemoglobinopathy/Sickle Cell/Thalassemia no  Fragile X Intellectual Disability no  Pt declines CF/SMA screening   If yes, discuss carrier screening and recommend consultation with [facility]/genetic counseling. If no, discuss option for carrier screening and/or genetic testing with Nuchal Ultrasound. Patient interested: Yes,  Pt and  decline genetic screening. Did not have done with prior pregnancy.    Appointment at [facility] made: Ambulatory referral provided        Interview education  [facility] Pregnancy Essentials Book reviewed and discussed: Yes       Prenatal lab work scripts: Yes  Preferred lab: lab [place]     Extra labs ordered: NONE     The patient has a history now or in prior pregnancy notable for:  2021 Pt delivered via  (LTCS)- progressed to complete, arrest of decent,  pushed for 2 hours. Vacuum assistance was attempted and unsuccessful      Details that I feel the provider should be aware of:   2021 Pt delivered via  ([facility]CS)- progressed to complete, arrest of  decent, pushed for 2 hours. Vacuum assistance was attempted and unsuccessful. *Pt would like to discuss option for    Hypothyroid-Follows Endocrinologist Dr. Bahman Field. Currently taking Levothyroxine 50 mcg daily. Due for repeat TSH, will have drawn with PN labs   Per Patient last TSH level-1.4  Nausea in pregnancy     The patient was oriented to our practice, reviewed delivering physicians and [address] in [place] for Delivery. All questions were answered. Interviewed by: Arnulfo Rodríguez.  Jerry Pean

## 2023-09-20 NOTE — PROGRESS NOTES
SW referred for initial prenatal assessment. Patient is Aguilar Love with an KEELY of 4/29/24. Patient agreeable to meeting with SW today. Patient reports she and FOB ( Bunny Dotson) are excited for pregnancy. They live with their two year old son [facility] in [facility]. Patient works for [employer] and FOB works in fire/water damage restoration. They both drive. Patient denies current financial concerns or need for [employer], parenting education, or baby supply resources. Patient reports strong support from FOB and family and enjoys working out and cooking for self-care. Patient reports she has been feeling a little down this pregnancy due to nausea, but otherwise is doing okay. Patient denies current or h/o mental health, substance use, DV/IPV, CYS involvement, and legal concerns. No other needs identified at this time, SW closing referral. Please re-refer as needed.

## 2023-09-21 NOTE — PATIENT INSTRUCTIONS
Congratulations!! Please review our Pregnancy Essential Guide for informative education and here is a link to take a [facility] tour of our [address].      [facility] Pregnancy Essentials Guide  [facility] ([email or web address])       [facility] and [address] [place] on [address] [facility]

## 2023-09-22 LAB
C TRACH RRNA SPEC QL NAA+PROBE: NEGATIVE
N GONORRHOEA RRNA SPEC QL NAA+PROBE: NEGATIVE

## 2023-09-29 PROBLEM — Z3A.08 8 WEEKS GESTATION OF PREGNANCY: Status: ACTIVE | Noted: 2023-09-20

## 2023-09-29 PROBLEM — O99.280 HYPOTHYROID IN PREGNANCY, ANTEPARTUM: Status: ACTIVE | Noted: 2023-09-20

## 2023-09-29 PROBLEM — O34.211 MATERNAL CARE DUE TO LOW TRANSVERSE UTERINE SCAR FROM PREVIOUS CESAREAN DELIVERY: Status: ACTIVE | Noted: 2023-09-20

## 2023-09-29 PROBLEM — E03.9 HYPOTHYROID IN PREGNANCY, ANTEPARTUM: Status: ACTIVE | Noted: 2023-09-20

## 2023-09-29 NOTE — ASSESSMENT & PLAN NOTE
Most recent TSH 1.4.  She is due for repeat and has order from endocrinologist. She will have drawn with PN labs

## 2023-09-29 NOTE — PROGRESS NOTES
Routine Prenatal Visit  [address]   [address] [facility], [address], [facility], [address] [facility],[address]    Assessment/Plan:  Pilo Hawkins is a 28y.o. year old  at [address] [place] [address] who presents for routine prenatal visit. 1. Maternal care due to low transverse uterine scar from previous  delivery    2. Hypothyroid in pregnancy, antepartum  Assessment & Plan:  Most recent TSH 1.4. She is due for repeat and has order from endocrinologist. She will have drawn with PN labs      3. 8 weeks gestation of pregnancy  Assessment & Plan:  U/S consistent with LMP      4. Unsure of LMP (last menstrual period) as reason for ultrasound scan  -     AMB US OB < 14 weeks single or first gestation level 1        Subjective:     CC: Prenatal care    Anastasiia Macedo is a 28 y.o.  female who presents for routine prenatal care at [address] [place] [address]. Pregnancy ROS: no leakage of fluid, pelvic pain, or vaginal bleeding. no fetal movement. The following portions of the patient's history were reviewed and updated as appropriate: allergies, current medications, past family history, past medical history, obstetric history, gynecologic history, past social history, past surgical history and problem list.      Objective:  LMP 2023   Pregravid Weight/BMI: 59.9 kg (132 lb) (BMI 23.01)  Current Weight:     Total Weight Gain: 0 kg (0 lb)        General: Well appearing, no distress  Respiratory: Unlabored breathing  Cardiovascular: Regular rate. Abdomen: Soft, gravid, nontender  Fundal Height: Appropriate for gestational age. Extremities: Warm and well perfused. Non tender.

## 2023-10-05 LAB
EXTERNAL ANTIBODY SCREEN: NORMAL
EXTERNAL CHLAMYDIA SCREEN: NEGATIVE
EXTERNAL GONORRHEA SCREEN: NEGATIVE
EXTERNAL HEMATOCRIT: 37 %
EXTERNAL HEMOGLOBIN: 12.4 G/DL
EXTERNAL HEPATITIS B SURFACE ANTIGEN: NEGATIVE
EXTERNAL HIV-1 AB: NORMAL
EXTERNAL HIV-1 P24 ANTIGEN: NORMAL
EXTERNAL HIV-1/2 AB-AG: NORMAL
EXTERNAL HIV-2 AB: NORMAL
EXTERNAL PLATELET COUNT: 287 K/ÂΜL
EXTERNAL RH FACTOR: POSITIVE
EXTERNAL RUBELLA IGG QUANTITATION: NORMAL
EXTERNAL SYPHILIS TOTAL IGG/IGM SCREENING: NONREACTIVE

## 2023-10-06 ENCOUNTER — ROUTINE PRENATAL (OUTPATIENT)
Dept: OBGYN CLINIC | Facility: CLINIC | Age: 33
End: 2023-10-06
Payer: COMMERCIAL

## 2023-10-06 ENCOUNTER — TELEPHONE (OUTPATIENT)
Dept: OBGYN CLINIC | Facility: CLINIC | Age: 33
End: 2023-10-06

## 2023-10-06 VITALS — SYSTOLIC BLOOD PRESSURE: 94 MMHG | DIASTOLIC BLOOD PRESSURE: 58 MMHG | BODY MASS INDEX: 22.98 KG/M2 | WEIGHT: 131.8 LBS

## 2023-10-06 DIAGNOSIS — Z3A.10 10 WEEKS GESTATION OF PREGNANCY: Primary | ICD-10-CM

## 2023-10-06 LAB
SL AMB  POCT GLUCOSE, UA: NEGATIVE
SL AMB POCT CLARITY,UA: CLEAR
SL AMB POCT COLOR,UA: YELLOW
SL AMB POCT URINE PROTEIN: NEGATIVE

## 2023-10-06 PROCEDURE — PNV: Performed by: OBSTETRICS & GYNECOLOGY

## 2023-10-06 PROCEDURE — 81002 URINALYSIS NONAUTO W/O SCOPE: CPT | Performed by: OBSTETRICS & GYNECOLOGY

## 2023-10-06 NOTE — ASSESSMENT & PLAN NOTE
Seen for minimal bleeding noted last night, not post coital. Some cramping. Exam without evidence of vaginal or cervical bleeding. +  Suspect minimal staining due to cervical irritation - grade II descensus noted. Reassurance, warnings given  Noted A positive; pn labs pending.  TSH with endocrine

## 2023-10-06 NOTE — PROGRESS NOTES
Routine Prenatal Visit  [address]  [address] [facility], [address], [place], [address] [facility]    Assessment/Plan:  Brandon Downs is a 28y.o. year old  at 10w4d who presents for routine prenatal visit. 1. 10 weeks gestation of pregnancy  Assessment & Plan:  Seen for minimal bleeding noted last night, not post coital. Some cramping. Exam without evidence of vaginal or cervical bleeding. +  Suspect minimal staining due to cervical irritation - grade II descensus noted. Reassurance, warnings given  Noted A positive; pn labs pending. TSH with endocrine    Orders:  -     POCT urine dip      Next OB Visit as scheduled. Subjective:     CC: Prenatal care    Keshia Hoyos is a 28 y.o. Napolean Ruffini female who presents for routine prenatal care at 10w4d. Pregnancy ROS: no leakage of fluid, min pink staining and cramping. The following portions of the patient's history were reviewed and updated as appropriate: allergies, current medications, past family history, past medical history, obstetric history, gynecologic history, past social history, past surgical history and problem list.      Objective:  BP 94/58   Wt 59.8 kg (131 lb 12.8 oz)   LMP 2023   BMI 22.98 kg/m²   Pregravid Weight/BMI: 59.9 kg (132 lb) (BMI 23.01)  Current Weight: 59.8 kg (131 lb 12.8 oz)   Total Weight Gain: -0.091 kg (-3.2 oz)   Pre-David Vitals    Flowsheet Row Most Recent Value   Prenatal Assessment    Prenatal Vitals    Blood Pressure 94/58   Weight - Scale 59.8 kg (131 lb 12.8 oz)   Urine Albumin/Glucose    Dilation/Effacement/Station    Vaginal Drainage    Edema            General: Well appearing, no distress  Pelvic exam: NEFG, vagina without evidence of bleeding, cervix closed, long, no bleeding. Grade II descensus noted. Uterus 10 wk size, RV, soft, nontender.    +

## 2023-10-06 NOTE — PATIENT INSTRUCTIONS
- Call office if severe pain, bleeding or leaking of fluid. - Continue prenatal vitamin and all prescribed medications.

## 2023-10-06 NOTE — TELEPHONE ENCOUNTER
Patient called stating that she is 10.4wks pregnant and she had some pink spotting with wiping last night with period-type cramps on and off. This morning with wiping she still have some pink spotting mix with clear discharge and cramps. She denies any recent intercourse. Denies pain with urination. No abnormal vaginal odor. Advised her to schedule an appointment today for further evaluation. She voiced agreement and would like to be seen today. Transferred her to  to schedule.

## 2023-10-09 LAB
ABO GROUP BLD: ABNORMAL
APPEARANCE UR: CLEAR
BACTERIA UR CULT: ABNORMAL
BACTERIA UR CULT: ABNORMAL
BACTERIA URNS QL MICRO: ABNORMAL
BASOPHILS # BLD AUTO: 0 X10E3/UL (ref 0–0.2)
BASOPHILS NFR BLD AUTO: 0 %
BILIRUB UR QL STRIP: NEGATIVE
BLD GP AB SCN SERPL QL: NEGATIVE
C TRACH RRNA SPEC QL NAA+PROBE: NEGATIVE
CASTS URNS QL MICRO: ABNORMAL /LPF
COLOR UR: YELLOW
EOSINOPHIL # BLD AUTO: 0 X10E3/UL (ref 0–0.4)
EOSINOPHIL NFR BLD AUTO: 1 %
EPI CELLS #/AREA URNS HPF: >10 /HPF (ref 0–10)
ERYTHROCYTE [DISTWIDTH] IN BLOOD BY AUTOMATED COUNT: 12.2 % (ref 11.7–15.4)
GLUCOSE UR QL: NEGATIVE
HBV SURFACE AG SERPL QL IA: NEGATIVE
HCT VFR BLD AUTO: 37 % (ref 34–46.6)
HCV AB S/CO SERPL IA: NON REACTIVE
HGB BLD-MCNC: 12.4 G/DL (ref 11.1–15.9)
HGB UR QL STRIP: NEGATIVE
HIV 1+2 AB+HIV1 P24 AG SERPL QL IA: NON REACTIVE
IMM GRANULOCYTES # BLD: 0 X10E3/UL (ref 0–0.1)
IMM GRANULOCYTES NFR BLD: 0 %
KETONES UR QL STRIP: NEGATIVE
LEUKOCYTE ESTERASE UR QL STRIP: NEGATIVE
LYMPHOCYTES # BLD AUTO: 1.8 X10E3/UL (ref 0.7–3.1)
LYMPHOCYTES NFR BLD AUTO: 30 %
MCH RBC QN AUTO: 30 PG (ref 26.6–33)
MCHC RBC AUTO-ENTMCNC: 33.5 G/DL (ref 31.5–35.7)
MCV RBC AUTO: 90 FL (ref 79–97)
MICRO URNS: ABNORMAL
MICRO URNS: ABNORMAL
MONOCYTES # BLD AUTO: 0.4 X10E3/UL (ref 0.1–0.9)
MONOCYTES NFR BLD AUTO: 7 %
N GONORRHOEA RRNA SPEC QL NAA+PROBE: NEGATIVE
NEUTROPHILS # BLD AUTO: 3.9 X10E3/UL (ref 1.4–7)
NEUTROPHILS NFR BLD AUTO: 62 %
NITRITE UR QL STRIP: NEGATIVE
OTHER ANTIBIOTIC SUSC ISLT: ABNORMAL
PH UR STRIP: 6 [PH] (ref 5–7.5)
PLATELET # BLD AUTO: 287 X10E3/UL (ref 150–450)
PROT UR QL STRIP: NEGATIVE
RBC # BLD AUTO: 4.13 X10E6/UL (ref 3.77–5.28)
RBC #/AREA URNS HPF: ABNORMAL /HPF (ref 0–2)
RH BLD: POSITIVE
RPR SER QL: NON REACTIVE
RUBV IGG SERPL IA-ACNC: 1.38 INDEX
SL AMB INTERPRETATION: NORMAL
SP GR UR: 1.01 (ref 1–1.03)
UROBILINOGEN UR STRIP-ACNC: 0.2 MG/DL (ref 0.2–1)
WBC # BLD AUTO: 6.2 X10E3/UL (ref 3.4–10.8)
WBC #/AREA URNS HPF: ABNORMAL /HPF (ref 0–5)

## 2023-10-17 ENCOUNTER — ROUTINE PRENATAL (OUTPATIENT)
Dept: OBGYN CLINIC | Facility: CLINIC | Age: 33
End: 2023-10-17
Payer: COMMERCIAL

## 2023-10-17 VITALS
HEIGHT: 64 IN | SYSTOLIC BLOOD PRESSURE: 90 MMHG | BODY MASS INDEX: 23.32 KG/M2 | DIASTOLIC BLOOD PRESSURE: 56 MMHG | WEIGHT: 136.6 LBS

## 2023-10-17 DIAGNOSIS — O99.280 HYPOTHYROID IN PREGNANCY, ANTEPARTUM: ICD-10-CM

## 2023-10-17 DIAGNOSIS — E03.9 HYPOTHYROID IN PREGNANCY, ANTEPARTUM: ICD-10-CM

## 2023-10-17 DIAGNOSIS — O34.211 MATERNAL CARE DUE TO LOW TRANSVERSE UTERINE SCAR FROM PREVIOUS CESAREAN DELIVERY: ICD-10-CM

## 2023-10-17 DIAGNOSIS — R82.71 GROUP B STREPTOCOCCAL BACTERIURIA: ICD-10-CM

## 2023-10-17 DIAGNOSIS — Z3A.12 12 WEEKS GESTATION OF PREGNANCY: Primary | ICD-10-CM

## 2023-10-17 LAB
SL AMB  POCT GLUCOSE, UA: NORMAL
SL AMB POCT URINE PROTEIN: NORMAL

## 2023-10-17 PROCEDURE — 81002 URINALYSIS NONAUTO W/O SCOPE: CPT | Performed by: PHYSICIAN ASSISTANT

## 2023-10-17 PROCEDURE — PNV: Performed by: PHYSICIAN ASSISTANT

## 2023-10-17 NOTE — PROGRESS NOTES
Routine Prenatal Visit  [address]  [address] [place] [address], [place], [address]    Assessment/Plan:  Priti Cross is a 28y.o. year old  at 12w1d who presents for routine prenatal visit. 1. 12 weeks gestation of pregnancy  Assessment & Plan:  Reviewed sequential screen vs cell free DNA. Unsure if doing either. Reviewed AFP to evaluate ONTD. Will consider for next visit. Results console updated. Reviewed nausea with patient. Recommend small frequent meals, vitamin B6, kristofer, unisom. Reviewed consideration of hydration drinks such as low sugar gatorade/propel, reviewed ensure drinks or shakes to help with meal replacement if unable to take in food. Return to office for ob check in 4 weeks. Orders:  -     POCT urine dip    2. Maternal care due to low transverse uterine scar from previous  delivery    3. Hypothyroid in pregnancy, antepartum  Assessment & Plan:  Following closely with endocrine. 4. Group B streptococcal bacteriuria  Assessment & Plan:  Reviewed less than 10,000 CFU on initial prenatal panel. Will plan to treat at delivery. Next OB Visit 4 weeks. Subjective:     CC: Prenatal care    Joslyn Torres is a 28 y.o.  female who presents for routine prenatal care at 12w1d. Pregnancy ROS: reports nausea, no vomiting. Difficult to take in fluids and food. No FM, N/V, HA, cramping, VB, LOF, edema, domestic violence, or smoking. Tolerating PNV.         The following portions of the patient's history were reviewed and updated as appropriate: allergies, current medications, past family history, past medical history, obstetric history, gynecologic history, past social history, past surgical history and problem list.      Objective:  BP 90/56 (BP Location: Left arm, Patient Position: Sitting, Cuff Size: Standard)   Ht 5' 3.5" (1.613 m)   Wt 62 kg (136 lb 9.6 oz)   LMP 2023   BMI 23.82 kg/m²   Pregravid Weight/BMI: 59.9 kg (132 lb) (BMI 23.01)  Current Weight: 62 kg (136 lb 9.6 oz)   Total Weight Gain: 2.087 kg (4 lb 9.6 oz)   Pre- Vitals      Flowsheet Row Most Recent Value   Prenatal Assessment    Fetal Heart Rate 156   Fundal Height (cm) 12 cm   Movement Absent   Prenatal Vitals    Blood Pressure 90/56   Weight - Scale 62 kg (136 lb 9.6 oz)   Urine Albumin/Glucose    Dilation/Effacement/Station    Vaginal Drainage    Edema    LLE Edema None   RLE Edema None   Facial Edema None             General: Well appearing, no distress  Abdomen: Soft, gravid, nontender  Fundal Height: Appropriate for gestational age. Extremities: Warm and well perfused. Non tender.

## 2023-10-17 NOTE — ASSESSMENT & PLAN NOTE
Reviewed sequential screen vs cell free DNA. Unsure if doing either. Reviewed AFP to evaluate ONTD. Will consider for next visit. Results console updated. Reviewed nausea with patient. Recommend small frequent meals, vitamin B6, kristofer, unisom. Reviewed consideration of hydration drinks such as low sugar gatorade/propel, reviewed ensure drinks or shakes to help with meal replacement if unable to take in food. Return to office for ob check in 4 weeks.

## 2023-10-23 ENCOUNTER — ROUTINE PRENATAL (OUTPATIENT)
Dept: PERINATAL CARE | Facility: OTHER | Age: 33
End: 2023-10-23
Payer: COMMERCIAL

## 2023-10-23 VITALS
SYSTOLIC BLOOD PRESSURE: 102 MMHG | HEIGHT: 64 IN | HEART RATE: 89 BPM | DIASTOLIC BLOOD PRESSURE: 54 MMHG | BODY MASS INDEX: 22.81 KG/M2 | WEIGHT: 133.6 LBS

## 2023-10-23 DIAGNOSIS — E03.9 HYPOTHYROID IN PREGNANCY, ANTEPARTUM: ICD-10-CM

## 2023-10-23 DIAGNOSIS — Z36.89 ENCOUNTER FOR OTHER SPECIFIED ANTENATAL SCREENING: ICD-10-CM

## 2023-10-23 DIAGNOSIS — O99.280 HYPOTHYROID IN PREGNANCY, ANTEPARTUM: ICD-10-CM

## 2023-10-23 DIAGNOSIS — O34.211 MATERNAL CARE DUE TO LOW TRANSVERSE UTERINE SCAR FROM PREVIOUS CESAREAN DELIVERY: Primary | ICD-10-CM

## 2023-10-23 DIAGNOSIS — Z3A.13 13 WEEKS GESTATION OF PREGNANCY: ICD-10-CM

## 2023-10-23 DIAGNOSIS — Z36.82 NUCHAL TRANSLUCENCY OF FETUS ON PRENATAL ULTRASOUND: ICD-10-CM

## 2023-10-23 PROCEDURE — 76801 OB US < 14 WKS SINGLE FETUS: CPT | Performed by: OBSTETRICS & GYNECOLOGY

## 2023-10-23 PROCEDURE — 76813 OB US NUCHAL MEAS 1 GEST: CPT | Performed by: OBSTETRICS & GYNECOLOGY

## 2023-10-23 PROCEDURE — 99204 OFFICE O/P NEW MOD 45 MIN: CPT | Performed by: OBSTETRICS & GYNECOLOGY

## 2023-10-23 NOTE — PROGRESS NOTES
OFFICE CONSULT  Referring physician:   Adeel Braga Md  [address]  [facility],  [address] [facility],      Dear Dr. José Luis Kat      Thank you for requesting a  consultation on your patient Ms. Tia Good for the following indications:  Genetic screening    History  Medications: Prenatal vitamins and levothyroxine 50 mcg 6 days a week. She reports she was on the same dose 5 days week prior to pregnancy and has been increased to 7 days per week only to then be weaned back down to 6 days a week recently. She reports she is being followed by endocrinology. Allergies to medications: Lactose  Past medical history: Maternal hypothyroidism  Past surgical history: Prior  section  Past obstetrical history:  2021 at 40 weeks she had an 8 pound 7 ounce baby boy by  section for prolonged second stage and failed vacuum. She reports that after delivery she was diagnosed with a hemorrhage. She would like a  with this pregnancy. Social history: She denies any substance use. First generation family history: She has a family history of an aunt who  from ovarian cancer under the age of 39. It was over 25 years ago so she is not sure if she was screened for the BRCA gene. Ultrasound findings: The ultrasound shows a fetus concordant with dates. The nasal bone and nuchal translucency appears normal. No malformations are seen on today's early ultrasound. Placenta is not near her prior  scar. The patient was informed of the findings and counseled about the limitations of the exam in detecting all forms of fetal congenital abnormalities. She does not report any vaginal bleeding or uterine cramping or contractions. Specific counseling was provided on the following problems:   We discussed the options for genetic screening which include invasive testing on the fetal placenta or on fetal skin cells within the amniotic fluid and compared this to noninvasive testing which includes cell free DNA screening. We reviewed the risks, the benefits and the limitations of each. In the end patient declines any genetic screening. Future tests recommended:  Screening for spina bifida with an MSAFP screen is a future test that can be prescribed through her OB office. This blood work should be drawn preferably at 16 to 18 weeks so that the results return prior to her next scan. The test though can be run until 21 weeks and 6 days if needed. Future ultrasounds ordered today:   Fetal Level II ultrasound imaging is recommended at 19-20 weeks' gestation. Recommend a follow-up ultrasound in the third trimester for an estimated fetal weight closer to delivery since she is considering a  and due to her hypothyroidism. Pre visit time reviewing her records   5 minutes  Face to face time 5 minutes  Post visit time on documentation of note, updating her problem list, adding orders and prescriptions 10 minutes. Procedures that were completed today were charged separately. The level of decision making was straight forward.     Rony Jensen MD

## 2023-10-23 NOTE — LETTER
2023     Erasmo Mills, [address]  [address] [facility]    Patient: Lizeth Good   YOB: 1990   Date of Visit: 10/23/2023       Dear Dr. Vernell Carmen: Thank you for referring Bossman Monteiro to me for evaluation. Below are my notes for this consultation. If you have questions, please do not hesitate to call me. I look forward to following your patient along with you. Sincerely,        Eliud Massey MD        CC: No Recipients    Eliud Massey MD  10/24/2023  1:41 AM  Sign when Signing Visit  OFFICE CONSULT  Referring physician:   Erasmo Mills Md  [address]  [facility],  [address] [place] [address],      Dear Dr. Vernell Carmen      Thank you for requesting a  consultation on your patient Ms. Lizeth Good for the following indications:  Genetic screening    History  Medications: Prenatal vitamins and levothyroxine 50 mcg 6 days a week. She reports she was on the same dose 5 days week prior to pregnancy and has been increased to 7 days per week only to then be weaned back down to 6 days a week recently. She reports she is being followed by endocrinology. Allergies to medications: Lactose  Past medical history: Maternal hypothyroidism  Past surgical history: Prior  section  Past obstetrical history:  2021 at 40 weeks she had an 8 pound 7 ounce baby boy by  section for prolonged second stage and failed vacuum. She reports that after delivery she was diagnosed with a hemorrhage. She would like a  with this pregnancy. Social history: She denies any substance use. First generation family history: She has a family history of an aunt who  from ovarian cancer under the age of 39. It was over 25 years ago so she is not sure if she was screened for the BRCA gene. Ultrasound findings: The ultrasound shows a fetus concordant with dates.  The nasal bone and nuchal translucency appears normal. No malformations are seen on today's early ultrasound. Placenta is not near her prior  scar. The patient was informed of the findings and counseled about the limitations of the exam in detecting all forms of fetal congenital abnormalities. She does not report any vaginal bleeding or uterine cramping or contractions. Specific counseling was provided on the following problems: We discussed the options for genetic screening which include invasive testing on the fetal placenta or on fetal skin cells within the amniotic fluid and compared this to noninvasive testing which includes cell free DNA screening. We reviewed the risks, the benefits and the limitations of each. In the end patient declines any genetic screening. Future tests recommended:  Screening for spina bifida with an MSAFP screen is a future test that can be prescribed through her OB office. This blood work should be drawn preferably at 16 to 18 weeks so that the results return prior to her next scan. The test though can be run until 21 weeks and 6 days if needed. Future ultrasounds ordered today:   Fetal Level II ultrasound imaging is recommended at 19-20 weeks' gestation. Recommend a follow-up ultrasound in the third trimester for an estimated fetal weight closer to delivery since she is considering a  and due to her hypothyroidism. Pre visit time reviewing her records   5 minutes  Face to face time 5 minutes  Post visit time on documentation of note, updating her problem list, adding orders and prescriptions 10 minutes. Procedures that were completed today were charged separately. The level of decision making was straight forward.     Keiry Chong MD

## 2023-11-14 ENCOUNTER — ROUTINE PRENATAL (OUTPATIENT)
Dept: OBGYN CLINIC | Facility: CLINIC | Age: 33
End: 2023-11-14
Payer: COMMERCIAL

## 2023-11-14 VITALS
WEIGHT: 143 LBS | DIASTOLIC BLOOD PRESSURE: 58 MMHG | HEIGHT: 64 IN | BODY MASS INDEX: 24.41 KG/M2 | SYSTOLIC BLOOD PRESSURE: 96 MMHG

## 2023-11-14 DIAGNOSIS — O34.211 MATERNAL CARE DUE TO LOW TRANSVERSE UTERINE SCAR FROM PREVIOUS CESAREAN DELIVERY: ICD-10-CM

## 2023-11-14 DIAGNOSIS — E03.9 HYPOTHYROID IN PREGNANCY, ANTEPARTUM: Primary | ICD-10-CM

## 2023-11-14 DIAGNOSIS — O99.280 HYPOTHYROID IN PREGNANCY, ANTEPARTUM: Primary | ICD-10-CM

## 2023-11-14 DIAGNOSIS — Z3A.16 16 WEEKS GESTATION OF PREGNANCY: ICD-10-CM

## 2023-11-14 LAB
SL AMB  POCT GLUCOSE, UA: NORMAL
SL AMB POCT URINE PROTEIN: NORMAL

## 2023-11-14 PROCEDURE — PNV: Performed by: PHYSICIAN ASSISTANT

## 2023-11-14 PROCEDURE — 81002 URINALYSIS NONAUTO W/O SCOPE: CPT | Performed by: PHYSICIAN ASSISTANT

## 2023-11-14 NOTE — ASSESSMENT & PLAN NOTE
Reviewed AFP to evaluate for ONTD. Patient declines. Has Level II ultrasound scheduled. Reviewed headaches recommend hydration, rest, tylenol, Riboflavin and Magnesium. Continue routine prenatal care. Return to office for ob check in 4 weeks.

## 2023-11-14 NOTE — PROGRESS NOTES
Routine Prenatal Visit  [address]  [address] [place] [address], [place], [address]    Assessment/Plan:  Pilo Hawkins is a 28y.o. year old  at 16w1d who presents for routine prenatal visit. 1. Hypothyroid in pregnancy, antepartum    2. 16 weeks gestation of pregnancy  Assessment & Plan:  Reviewed AFP to evaluate for ONTD. Patient declines. Has Level II ultrasound scheduled. Reviewed headaches recommend hydration, rest, tylenol, Riboflavin and Magnesium. Continue routine prenatal care. Return to office for ob check in 4 weeks. Orders:  -     POCT urine dip    3. Maternal care due to low transverse uterine scar from previous  delivery        Next OB Visit 4 weeks. Subjective:     CC: Prenatal care    Anastasiia Macedo is a 28 y.o.  female who presents for routine prenatal care at 16w1d. Pregnancy ROS: Reports nausea, no vomiting. Headaches. No FM, cramping, VB, LOF, edema, domestic violence, or smoking. Tolerating PNV.       The following portions of the patient's history were reviewed and updated as appropriate: allergies, current medications, past family history, past medical history, obstetric history, gynecologic history, past social history, past surgical history and problem list.      Objective:  BP 96/58 (BP Location: Left arm, Patient Position: Sitting, Cuff Size: Standard)   Ht 5' 3.5" (1.613 m)   Wt 64.9 kg (143 lb)   LMP 2023   BMI 24.93 kg/m²   Pregravid Weight/BMI: 59.9 kg (132 lb) (BMI 23.01)  Current Weight: 64.9 kg (143 lb)   Total Weight Gain: 4.99 kg (11 lb)   Pre- Vitals      Flowsheet Row Most Recent Value   Prenatal Assessment    Fetal Heart Rate 151   Fundal Height (cm) 16 cm   Movement Absent   Prenatal Vitals    Blood Pressure 96/58   Weight - Scale 64.9 kg (143 lb)   Urine Albumin/Glucose    Dilation/Effacement/Station    Vaginal Drainage    Edema    LLE Edema None   RLE Edema None   Facial Edema None             General: Well appearing, no distress  Abdomen: Soft, gravid, nontender  Fundal Height: Appropriate for gestational age. Extremities: Warm and well perfused. Non tender.

## 2023-12-11 ENCOUNTER — ROUTINE PRENATAL (OUTPATIENT)
Dept: PERINATAL CARE | Facility: OTHER | Age: 33
End: 2023-12-11
Payer: COMMERCIAL

## 2023-12-11 VITALS
HEIGHT: 64 IN | WEIGHT: 148.2 LBS | DIASTOLIC BLOOD PRESSURE: 60 MMHG | HEART RATE: 91 BPM | SYSTOLIC BLOOD PRESSURE: 98 MMHG | BODY MASS INDEX: 25.3 KG/M2

## 2023-12-11 DIAGNOSIS — O35.03X0 CHOROID PLEXUS CYST OF FETUS AFFECTING CARE OF MOTHER, ANTEPARTUM, SINGLE OR UNSPECIFIED FETUS: ICD-10-CM

## 2023-12-11 DIAGNOSIS — O34.211 MATERNAL CARE DUE TO LOW TRANSVERSE UTERINE SCAR FROM PREVIOUS CESAREAN DELIVERY: ICD-10-CM

## 2023-12-11 DIAGNOSIS — Z36.3 ENCOUNTER FOR ANTENATAL SCREENING FOR MALFORMATION: ICD-10-CM

## 2023-12-11 DIAGNOSIS — Z3A.20 20 WEEKS GESTATION OF PREGNANCY: Primary | ICD-10-CM

## 2023-12-11 DIAGNOSIS — Z36.86 ENCOUNTER FOR ANTENATAL SCREENING FOR CERVICAL LENGTH: ICD-10-CM

## 2023-12-11 PROCEDURE — 76811 OB US DETAILED SNGL FETUS: CPT | Performed by: OBSTETRICS & GYNECOLOGY

## 2023-12-11 PROCEDURE — 99213 OFFICE O/P EST LOW 20 MIN: CPT | Performed by: OBSTETRICS & GYNECOLOGY

## 2023-12-11 PROCEDURE — 36415 COLL VENOUS BLD VENIPUNCTURE: CPT | Performed by: OBSTETRICS & GYNECOLOGY

## 2023-12-11 PROCEDURE — 76817 TRANSVAGINAL US OBSTETRIC: CPT | Performed by: OBSTETRICS & GYNECOLOGY

## 2023-12-11 NOTE — PROGRESS NOTES
[address] [facility]: Gómez Lamar was seen today for anatomic survey and cervical length screening ultrasound. See ultrasound report under "OB Procedures" tab. The time spent on this established patient on the encounter date included 5 minutes previsit service time reviewing records and precharting, 10 minutes face-to-face service time counseling regarding results and coordinating care, and  6 minutes charting, totalling 21 minutes.   Please don't hesitate to contact our office with any concerns or questions.  -Barrett Faust MD

## 2023-12-11 NOTE — LETTER
2023    [place], [address]  [address] [facility]    Patient: Liza Good   YOB: 1990   Date of Visit: 2023   Gestational age 24w0d   Vega Ean of this communication: Routine though please note isolated choroid plexus cyst, NIPS drawn, return for growth at 30-32 weeks as desires        Dear Dr Etta Chavez,    This patient was seen recently in our  office. The content of my evaluation today is in the ultrasound report under "OB Procedures" tab. Please don't hesitate to contact our office with any concerns or questions.      Sincerely,      Merly Flynn MD  Attending Physician, [address] [place] [address]

## 2023-12-11 NOTE — PROGRESS NOTES
Ultrasound Probe Disinfection    A transvaginal ultrasound was performed. Prior to use, disinfection was performed with High Level Disinfection Process ([employer]). Probe serial number [id number]: [id number] was used.       Alina Cardoza  12/11/23  8:05 AM

## 2023-12-11 NOTE — PROGRESS NOTES
Patient chose to have [facility] Non-invasive Prenatal Screen with fetal sex. Patient choose billed through insurance. Patient assistance program (PAP) application provided to patient yes. PAP sent with specimen No.     Patient given brochure and is aware [facility] will contact their insurance and coordinate coverage. Patient made aware she will receive a text message and e-mail from [employer]. Patient informed text/phone call message will come from area code  "415". Provided [employer] # [phone] and web site at [employer].   "[place] your test online" card with barcode and test tube ID provided to patient. Reviewed [facility] web site states 5-7 business days for results via their portal.   [employer] message will be sent to patient when [facility] receives results /provider reviews. 2 vials of blood drawn from  left  arm by ALINA Diallo MA. Patient tolerated blood draw without difficulty. Specimens labeled with patient identifiers (name, date of birth, specimen collection date), order and specimen were verified with patient, packed and sent via [facility]. FED EX  tracking #  [id number]  Copy of lab order scanned to Epic media. Maternal Fetal Medicine will have results in approximately 7-10 business days and will call patient or notify via [address] [place] [address]. Patient aware viewing lab result online will reveal fetal sex if ordered. Patient verbalized understanding of all instructions and no questions at this time.

## 2023-12-11 NOTE — PATIENT INSTRUCTIONS
Thank you for choosing us for your  care today. If you have any questions about your ultrasound or care, please do not hesitate to contact us or your primary obstetrician. Some general instructions for your pregnancy are:    Protect against coronavirus: get vaccinated - pregnant women are increased risk of severe COVID. Notify your primary care doctor if you have any symptoms. Exercise: Aim for 22 minutes per day (150 minutes per week) of regular exercise. Walking is great! Nutrition: aim for calcium-rich and iron-rich foods as well as healthy sources of protein. Learn about Preeclampsia: preeclampsia is a common, serious high blood pressure complication in pregnancy. A blood pressure of 140DAXQ (systolic or top number) or 79WXBU (diastolic or bottom number) is not normal and needs evaluation by your doctor. Aspirin is sometimes prescribed in early pregnancy to prevent preeclampsia in women with risk factors - ask your obstetrician if you should be on this medication. If you smoke, try to reduce how many cigarettes you smoke or try to quit completely. Do not vape. Other warning signs to watch out for in pregnancy or postpartum: chest pain, obstructed breathing or shortness of breath, seizures, thoughts of hurting yourself or your baby, bleeding, a painful or swollen leg, fever, or headache (see [facility] POST-BIRTH Warning Signs campaign). If these happen call 911. Itching is also not normal in pregnancy and if you experience this, especially over your hands and feet, potentially worse at night, notify your doctors.

## 2023-12-12 ENCOUNTER — ROUTINE PRENATAL (OUTPATIENT)
Dept: OBGYN CLINIC | Facility: CLINIC | Age: 33
End: 2023-12-12
Payer: COMMERCIAL

## 2023-12-12 VITALS
HEIGHT: 64 IN | DIASTOLIC BLOOD PRESSURE: 58 MMHG | BODY MASS INDEX: 25.61 KG/M2 | SYSTOLIC BLOOD PRESSURE: 100 MMHG | WEIGHT: 150 LBS

## 2023-12-12 DIAGNOSIS — O99.280 HYPOTHYROID IN PREGNANCY, ANTEPARTUM: ICD-10-CM

## 2023-12-12 DIAGNOSIS — R82.71 GROUP B STREPTOCOCCAL BACTERIURIA: ICD-10-CM

## 2023-12-12 DIAGNOSIS — Z3A.20 20 WEEKS GESTATION OF PREGNANCY: ICD-10-CM

## 2023-12-12 DIAGNOSIS — E03.9 HYPOTHYROID IN PREGNANCY, ANTEPARTUM: ICD-10-CM

## 2023-12-12 DIAGNOSIS — O34.211 MATERNAL CARE DUE TO LOW TRANSVERSE UTERINE SCAR FROM PREVIOUS CESAREAN DELIVERY: Primary | ICD-10-CM

## 2023-12-12 LAB
SL AMB  POCT GLUCOSE, UA: NORMAL
SL AMB POCT URINE PROTEIN: NORMAL

## 2023-12-12 PROCEDURE — PNV: Performed by: OBSTETRICS & GYNECOLOGY

## 2023-12-12 PROCEDURE — 81002 URINALYSIS NONAUTO W/O SCOPE: CPT | Performed by: OBSTETRICS & GYNECOLOGY

## 2023-12-13 NOTE — ASSESSMENT & PLAN NOTE
Anatomy U/S yesterday. CP cysts noted and she had fetal DNA drawn yesterday. Pt given reassurance that with an otherwise normal U/S the risk of T18 is very low.

## 2023-12-13 NOTE — PROGRESS NOTES
Routine Prenatal Visit  [address] [place] [address], [address], [facility], [address] [facility],[address]    Assessment/Plan:  Cathy Patton is a 28y.o. year old  at 20w1d who presents for routine prenatal visit. 1. Maternal care due to low transverse uterine scar from previous  delivery  Assessment & Plan:  Reviewed C/S vs TOLAC. She is still considering options. 2. Hypothyroid in pregnancy, antepartum    3. Group B streptococcal bacteriuria    4. 20 weeks gestation of pregnancy  Assessment & Plan:  Anatomy U/S yesterday. CP cysts noted and she had fetal DNA drawn yesterday. Pt given reassurance that with an otherwise normal U/S the risk of T18 is very low. Orders:  -     POCT urine dip          Subjective:     CC: Prenatal care    Hayden Ibarra is a 28 y.o.  female who presents for routine prenatal care at 20w1d. Pregnancy ROS: no leakage of fluid, pelvic pain, or vaginal bleeding. normal fetal movement.     The following portions of the patient's history were reviewed and updated as appropriate: allergies, current medications, past family history, past medical history, obstetric history, gynecologic history, past social history, past surgical history and problem list.      Objective:  /58 (BP Location: Right arm, Patient Position: Sitting, Cuff Size: Standard)   Ht 5' 3.5" (1.613 m)   Wt 68 kg (150 lb)   LMP 2023   BMI 26.15 kg/m²   Pregravid Weight/BMI: 59.9 kg (132 lb) (BMI 23.01)  Current Weight: 68 kg (150 lb)   Total Weight Gain: 8.165 kg (18 lb)   Pre-David Vitals      Flowsheet Row Most Recent Value   Prenatal Assessment    Fetal Heart Rate 150   Fundal Height (cm) 22 cm   Movement Present   Prenatal Vitals    Blood Pressure 100/58   Weight - Scale 68 kg (150 lb)   Urine Albumin/Glucose    Dilation/Effacement/Station    Vaginal Drainage    Edema              General: Well appearing, no distress  Respiratory: Unlabored breathing  Cardiovascular: Regular rate.  Abdomen: Soft, gravid, nontender  Fundal Height: Appropriate for gestational age. Extremities: Warm and well perfused. Non tender.

## 2023-12-18 ENCOUNTER — TELEPHONE (OUTPATIENT)
Dept: PERINATAL CARE | Facility: OTHER | Age: 33
End: 2023-12-18

## 2023-12-18 ENCOUNTER — TELEPHONE (OUTPATIENT)
Dept: OBGYN CLINIC | Facility: CLINIC | Age: 33
End: 2023-12-18

## 2023-12-18 NOTE — TELEPHONE ENCOUNTER
"Nimco is having head congestion. She was inquiring what can take for \"cold\". Advised Nimco to do a home COVID test. May take Tylenol or Sudafed cold/sinus, Robitussin CF,DM or PE, sore throat lozenges, Mucinex without \"D\", Flonase or normal saline nasal spray. Informed to f/u with PCP,if has a fever or symptoms worsen.   "

## 2023-12-18 NOTE — TELEPHONE ENCOUNTER
Left voicemail for Nimco Good reviewing her non invasive prenatal screening (NIPS) results screened low risk, fetal sex not revealed. Explained if Nimco Good would like to know the fetal sex of her baby, encouraged to review test results in mychart. If patient wishes not want to know fetal sex, advised to not open her test result in mychart. Advised there is routine blood screening for spina bifida that is recommended to complete in the second trimester (16-18 weeks) that will be completed through your obstetrician's office. Contact the  office at [phone] with any questions.

## 2023-12-18 NOTE — TELEPHONE ENCOUNTER
----- Message from Margie Miranda MD sent at 12/18/2023  9:06 AM EST -----  Regarding: Normal NIPS    ----- Message -----  From: Interface, Transcription Incoming  Sent: 12/15/2023  10:36 PM EST  To: Margie Miranda MD

## 2024-01-10 ENCOUNTER — ROUTINE PRENATAL (OUTPATIENT)
Dept: OBGYN CLINIC | Facility: CLINIC | Age: 34
End: 2024-01-10
Payer: COMMERCIAL

## 2024-01-10 VITALS
WEIGHT: 158 LBS | DIASTOLIC BLOOD PRESSURE: 56 MMHG | BODY MASS INDEX: 26.98 KG/M2 | SYSTOLIC BLOOD PRESSURE: 96 MMHG | HEIGHT: 64 IN

## 2024-01-10 DIAGNOSIS — Z36.89 ENCOUNTER FOR OTHER SPECIFIED ANTENATAL SCREENING: ICD-10-CM

## 2024-01-10 DIAGNOSIS — O99.280 HYPOTHYROID IN PREGNANCY, ANTEPARTUM: ICD-10-CM

## 2024-01-10 DIAGNOSIS — O35.03X0 CHOROID PLEXUS CYST OF FETUS AFFECTING CARE OF MOTHER, ANTEPARTUM, SINGLE OR UNSPECIFIED FETUS: ICD-10-CM

## 2024-01-10 DIAGNOSIS — O34.211 MATERNAL CARE DUE TO LOW TRANSVERSE UTERINE SCAR FROM PREVIOUS CESAREAN DELIVERY: Primary | ICD-10-CM

## 2024-01-10 DIAGNOSIS — Z3A.24 24 WEEKS GESTATION OF PREGNANCY: ICD-10-CM

## 2024-01-10 DIAGNOSIS — O99.820 GROUP B STREPTOCOCCAL CARRIAGE COMPLICATING PREGNANCY: ICD-10-CM

## 2024-01-10 DIAGNOSIS — E03.9 HYPOTHYROID IN PREGNANCY, ANTEPARTUM: ICD-10-CM

## 2024-01-10 LAB
SL AMB  POCT GLUCOSE, UA: NEGATIVE
SL AMB POCT URINE PROTEIN: NEGATIVE

## 2024-01-10 PROCEDURE — PNV: Performed by: STUDENT IN AN ORGANIZED HEALTH CARE EDUCATION/TRAINING PROGRAM

## 2024-01-10 PROCEDURE — 81002 URINALYSIS NONAUTO W/O SCOPE: CPT | Performed by: STUDENT IN AN ORGANIZED HEALTH CARE EDUCATION/TRAINING PROGRAM

## 2024-01-10 NOTE — ASSESSMENT & PLAN NOTE
- Isolated  left CPC seen on anatomy on 12/11. NIPS subsequently drawn and resulted with low risk for aneuploidy.

## 2024-01-10 NOTE — ASSESSMENT & PLAN NOTE
- Pt reports TSH checked by Endocrine 2 weeks ago. She agrees to send me a copy of her lab report via [employer] for our records.   - Continue levothyroxine as prescribed by Endocrinology.

## 2024-01-10 NOTE — ASSESSMENT & PLAN NOTE
- Repeat  section was previously recommended due to arrest of descent and failed vacuum. Following discussion with M at NT, patient is considering TOLAC. We reviewed again the factors that affect her likelihood of successful . We also reviewed risks and benefits of TOLAC versus repeat  section prior to onset of labor versus potential need for urgent/emergent  section while laboring. Per [facility]  calculator, her predicted  success rate is 55.7% She wishes to await assessment of fetal growth in the 3rd trimester and review plan further at that time.

## 2024-01-10 NOTE — PROGRESS NOTES
Routine Prenatal Visit  [facility] - [place] [address], [address], [place] [address]    Assessment/Plan:  Nimco is a 33 y.o. year old  at 24w2d who presents for routine prenatal visit.     1. Maternal care due to low transverse uterine scar from previous  delivery  Assessment & Plan:  - Repeat  section was previously recommended due to arrest of descent and failed vacuum. Following discussion with MFM at [facility], patient is considering TOLAC. We reviewed again the factors that affect her likelihood of successful . We also reviewed risks and benefits of TOLAC versus repeat  section prior to onset of labor versus potential need for urgent/emergent  section while laboring. Per [facility]  calculator, her predicted  success rate is 55.7% She wishes to await assessment of fetal growth in the 3rd trimester and review plan further at that time.       2. Choroid plexus cyst of fetus affecting care of mother, antepartum, single or unspecified fetus  Assessment & Plan:  - Isolated  left CPC seen on anatomy on . NIPS subsequently drawn and resulted with low risk for aneuploidy.       3. Group B streptococcal carriage complicating pregnancy    4. Hypothyroid in pregnancy, antepartum  Assessment & Plan:  - Pt reports TSH checked by Endocrine 2 weeks ago. She agrees to send me a copy of her lab report via [employer] for our records.   - Continue levothyroxine as prescribed by Endocrinology.       5. 24 weeks gestation of pregnancy  Assessment & Plan:  - PTL/PPROM/Bleeding precautions given.   - MFM consult report from level II ultrasound reviewed. Repeat US is scheduled in 3rd trimester, per [facility] recommendations.  - 28 week labs ordered, lab requisition provided to patient.   - Problem list updated, results console reviewed and updated with pertinent prenatal labs.  - PMH, PSH, medications reviewed and updated as needed.  - Return to office in 4 wk(s) for routine prenatal  "care      Orders:  -     POCT urine dip    6. Encounter for other specified  screening  -     Glucose, 1H PG; Future  -     CBC; Future  -     RPR, Rfx Qn RPR/Confirm TP; Future  -     Glucose, 1H PG  -     CBC  -     RPR, Rfx Qn RPR/Confirm TP          Subjective:   Nimco Good is a 33 y.o.  who presents for routine prenatal care at 24w2d.  Complaints today: No  LOF: No; VB: No; Contractions: No; FM: Present    Objective:  BP 96/56 (BP Location: Left arm, Patient Position: Sitting, Cuff Size: Standard)   Ht 5' 3.5\" (1.613 m)   Wt 71.7 kg (158 lb)   LMP 2023   BMI 27.55 kg/m²     General: Well appearing, no distress  Respiratory: Unlabored breathing  Cardiovascular: Regular rate.  Abdomen: Soft, gravid, nontender  Extremities: Warm and well perfused.  Non tender.    Pregravid Weight/BMI: 59.9 kg (132 lb) (BMI 23.01)  Current Weight: 71.7 kg (158 lb)   Total Weight Gain: 11.8 kg (26 lb)     Pre-David Vitals      Flowsheet Row Most Recent Value   Prenatal Assessment    Fetal Heart Rate 150   Fundal Height (cm) 24 cm   Prenatal Vitals    Blood Pressure 96/56   Weight - Scale 71.7 kg (158 lb)   Urine Albumin/Glucose    Dilation/Effacement/Station    Vaginal Drainage    Draining Fluid No   Edema    LLE Edema None   RLE Edema None   Facial Edema None             Claude Lei MD  1/10/2024 6:43 PM   "

## 2024-01-10 NOTE — ASSESSMENT & PLAN NOTE
- PTL/PPROM/Bleeding precautions given.   - MFM consult report from level II ultrasound reviewed. Repeat US is scheduled in 3rd trimester, per [facility] recommendations.  - 28 week labs ordered, lab requisition provided to patient.   - Problem list updated, results console reviewed and updated with pertinent prenatal labs.  - PMH, PSH, medications reviewed and updated as needed.  - Return to office in 4 wk(s) for routine prenatal care

## 2024-02-05 ENCOUNTER — ROUTINE PRENATAL (OUTPATIENT)
Dept: OBGYN CLINIC | Facility: CLINIC | Age: 34
End: 2024-02-05
Payer: COMMERCIAL

## 2024-02-05 VITALS
DIASTOLIC BLOOD PRESSURE: 52 MMHG | SYSTOLIC BLOOD PRESSURE: 92 MMHG | BODY MASS INDEX: 28.17 KG/M2 | WEIGHT: 165 LBS | HEIGHT: 64 IN

## 2024-02-05 DIAGNOSIS — O99.280 HYPOTHYROID IN PREGNANCY, ANTEPARTUM: ICD-10-CM

## 2024-02-05 DIAGNOSIS — O99.820 GROUP B STREPTOCOCCAL CARRIAGE COMPLICATING PREGNANCY: ICD-10-CM

## 2024-02-05 DIAGNOSIS — Z3A.28 28 WEEKS GESTATION OF PREGNANCY: Primary | ICD-10-CM

## 2024-02-05 DIAGNOSIS — E03.9 HYPOTHYROID IN PREGNANCY, ANTEPARTUM: ICD-10-CM

## 2024-02-05 DIAGNOSIS — O34.211 MATERNAL CARE DUE TO LOW TRANSVERSE UTERINE SCAR FROM PREVIOUS CESAREAN DELIVERY: ICD-10-CM

## 2024-02-05 DIAGNOSIS — O35.03X0 CHOROID PLEXUS CYST OF FETUS AFFECTING CARE OF MOTHER, ANTEPARTUM, SINGLE OR UNSPECIFIED FETUS: ICD-10-CM

## 2024-02-05 LAB
SL AMB  POCT GLUCOSE, UA: NORMAL
SL AMB POCT URINE PROTEIN: NORMAL

## 2024-02-05 PROCEDURE — 81002 URINALYSIS NONAUTO W/O SCOPE: CPT | Performed by: OBSTETRICS & GYNECOLOGY

## 2024-02-05 PROCEDURE — PNV: Performed by: OBSTETRICS & GYNECOLOGY

## 2024-02-05 NOTE — PROGRESS NOTES
"Routine Prenatal Visit  [facility] - [place] [address], [address], [place] [address]    Assessment/Plan:  Nimco is a 33 y.o. year old  at 28w0d who presents for routine prenatal visit.     1. 28 weeks gestation of pregnancy  -     POCT urine dip    2. Maternal care due to low transverse uterine scar from previous  delivery    3. Hypothyroid in pregnancy, antepartum    4. Group B streptococcal carriage complicating pregnancy    5. Choroid plexus cyst of fetus affecting care of mother, antepartum, single or unspecified fetus    + fm no utcx  co of  pelvic  floor   relaxation, minimal leaking, no bleeding   TDAp next visit  28 week labs   on       Subjective:     CC: Prenatal care    Nimco Good is a 33 y.o.  female who presents for routine prenatal care at 28w0d.  Pregnancy ROS: no  leakage of fluid, pelvic pain, or vaginal bleeding.  +  fetal movement.    The following portions of the patient's history were reviewed and updated as appropriate: allergies, current medications, past family history, past medical history, obstetric history, gynecologic history, past social history, past surgical history and problem list.      Objective:  BP 92/52 (BP Location: Left arm, Patient Position: Sitting, Cuff Size: Standard)   Ht 5' 3.5\" (1.613 m)   Wt 74.8 kg (165 lb)   LMP 2023   BMI 28.77 kg/m²   Pregravid Weight/BMI: 59.9 kg (132 lb) (BMI 23.01)  Current Weight: 74.8 kg (165 lb)   Total Weight Gain: 15 kg (33 lb)   Pre- Vitals    Flowsheet Row Most Recent Value   Prenatal Assessment    Fetal Heart Rate 154   Fundal Height (cm) 28 cm   Movement Present   Prenatal Vitals    Blood Pressure 92/52   Weight - Scale 74.8 kg (165 lb)   Urine Albumin/Glucose    Dilation/Effacement/Station    Vaginal Drainage    Draining Fluid No   Edema    LLE Edema Trace   RLE Edema Trace   Facial Edema None           General: Well appearing, no distress  Respiratory: Unlabored " breathing  Cardiovascular: Regular rate.  Abdomen: Soft, gravid, nontender  Fundal Height: Appropriate for gestational age.  Extremities: Warm and well perfused.  Non tender.

## 2024-02-07 LAB
EXTERNAL HEMOGLOBIN: 12.2 G/DL
EXTERNAL PLATELET COUNT: 264 K/ÂΜL
EXTERNAL SYPHILIS TOTAL IGG/IGM SCREENING: NORMAL

## 2024-02-08 LAB
ERYTHROCYTE [DISTWIDTH] IN BLOOD BY AUTOMATED COUNT: 13.1 % (ref 11.7–15.4)
GLUCOSE 1H P 50 G GLC PO SERPL-MCNC: 122 MG/DL (ref 70–139)
HCT VFR BLD AUTO: 36.1 % (ref 34–46.6)
HGB BLD-MCNC: 12.2 G/DL (ref 11.1–15.9)
MCH RBC QN AUTO: 32 PG (ref 26.6–33)
MCHC RBC AUTO-ENTMCNC: 33.8 G/DL (ref 31.5–35.7)
MCV RBC AUTO: 95 FL (ref 79–97)
PLATELET # BLD AUTO: 264 X10E3/UL (ref 150–450)
RBC # BLD AUTO: 3.81 X10E6/UL (ref 3.77–5.28)
RPR SER QL: NON REACTIVE
WBC # BLD AUTO: 8.8 X10E3/UL (ref 3.4–10.8)

## 2024-02-19 ENCOUNTER — ULTRASOUND (OUTPATIENT)
Dept: PERINATAL CARE | Facility: OTHER | Age: 34
End: 2024-02-19
Payer: COMMERCIAL

## 2024-02-19 VITALS
DIASTOLIC BLOOD PRESSURE: 62 MMHG | HEIGHT: 64 IN | WEIGHT: 167 LBS | SYSTOLIC BLOOD PRESSURE: 90 MMHG | BODY MASS INDEX: 28.51 KG/M2 | HEART RATE: 96 BPM

## 2024-02-19 DIAGNOSIS — Z3A.29 29 WEEKS GESTATION OF PREGNANCY: ICD-10-CM

## 2024-02-19 DIAGNOSIS — E03.9 HYPOTHYROID IN PREGNANCY, ANTEPARTUM: ICD-10-CM

## 2024-02-19 DIAGNOSIS — O35.03X0 CHOROID PLEXUS CYST OF FETUS AFFECTING CARE OF MOTHER, ANTEPARTUM, SINGLE OR UNSPECIFIED FETUS: ICD-10-CM

## 2024-02-19 DIAGNOSIS — O34.211 MATERNAL CARE DUE TO LOW TRANSVERSE UTERINE SCAR FROM PREVIOUS CESAREAN DELIVERY: Primary | ICD-10-CM

## 2024-02-19 DIAGNOSIS — Z36.89 ENCOUNTER FOR ULTRASOUND TO CHECK FETAL GROWTH: ICD-10-CM

## 2024-02-19 DIAGNOSIS — O99.280 HYPOTHYROID IN PREGNANCY, ANTEPARTUM: ICD-10-CM

## 2024-02-19 PROCEDURE — 76816 OB US FOLLOW-UP PER FETUS: CPT | Performed by: OBSTETRICS & GYNECOLOGY

## 2024-02-19 PROCEDURE — 99212 OFFICE O/P EST SF 10 MIN: CPT | Performed by: OBSTETRICS & GYNECOLOGY

## 2024-02-19 NOTE — PROGRESS NOTES
"[facility]: Nimco Good was seen today for fetal growth assessment ultrasound.  See ultrasound report under \"OB Procedures\" tab.   The time spent on this established patient on the encounter date included 4 minutes previsit service time reviewing records and precharting, 4 minutes face-to-face service time counseling regarding results and coordinating care, and  4 minutes charting, totalling 12 minutes.  Please don't hesitate to contact our office with any concerns or questions.  -Margie Miranda MD      "

## 2024-02-21 ENCOUNTER — ROUTINE PRENATAL (OUTPATIENT)
Dept: OBGYN CLINIC | Facility: CLINIC | Age: 34
End: 2024-02-21
Payer: COMMERCIAL

## 2024-02-21 VITALS
HEIGHT: 64 IN | DIASTOLIC BLOOD PRESSURE: 60 MMHG | SYSTOLIC BLOOD PRESSURE: 106 MMHG | BODY MASS INDEX: 28.85 KG/M2 | WEIGHT: 169 LBS

## 2024-02-21 DIAGNOSIS — E03.9 HYPOTHYROID IN PREGNANCY, ANTEPARTUM: ICD-10-CM

## 2024-02-21 DIAGNOSIS — O99.280 HYPOTHYROID IN PREGNANCY, ANTEPARTUM: ICD-10-CM

## 2024-02-21 DIAGNOSIS — O35.03X0 CHOROID PLEXUS CYST OF FETUS AFFECTING CARE OF MOTHER, ANTEPARTUM, SINGLE OR UNSPECIFIED FETUS: ICD-10-CM

## 2024-02-21 DIAGNOSIS — Z23 NEED FOR TDAP VACCINATION: ICD-10-CM

## 2024-02-21 DIAGNOSIS — Z3A.30 30 WEEKS GESTATION OF PREGNANCY: ICD-10-CM

## 2024-02-21 DIAGNOSIS — O99.820 GROUP B STREPTOCOCCAL CARRIAGE COMPLICATING PREGNANCY: ICD-10-CM

## 2024-02-21 DIAGNOSIS — O34.211 MATERNAL CARE DUE TO LOW TRANSVERSE UTERINE SCAR FROM PREVIOUS CESAREAN DELIVERY: Primary | ICD-10-CM

## 2024-02-21 LAB
SL AMB  POCT GLUCOSE, UA: NORMAL
SL AMB POCT URINE PROTEIN: NORMAL

## 2024-02-21 PROCEDURE — 81002 URINALYSIS NONAUTO W/O SCOPE: CPT | Performed by: STUDENT IN AN ORGANIZED HEALTH CARE EDUCATION/TRAINING PROGRAM

## 2024-02-21 PROCEDURE — 90471 IMMUNIZATION ADMIN: CPT | Performed by: STUDENT IN AN ORGANIZED HEALTH CARE EDUCATION/TRAINING PROGRAM

## 2024-02-21 PROCEDURE — 90715 TDAP VACCINE 7 YRS/> IM: CPT | Performed by: STUDENT IN AN ORGANIZED HEALTH CARE EDUCATION/TRAINING PROGRAM

## 2024-02-21 PROCEDURE — PNV: Performed by: STUDENT IN AN ORGANIZED HEALTH CARE EDUCATION/TRAINING PROGRAM

## 2024-02-21 NOTE — ASSESSMENT & PLAN NOTE
- PTL/PPROM/Bleeding precautions given. Kick counts reviewed.  - Third trimester labs reviewed.  - Recommendation for administration of TDaP was reviewed. TDaP administered today. Discussed recommendation for partner and close family members to ensure up-to-date on TDaP vaccination prior to delivery.   - Problem list updated, results console reviewed and updated with pertinent prenatal labs.  - PMH, PSH, medications reviewed and updated as needed  - Return to office in 2 wk(s) for routine prenatal care

## 2024-02-21 NOTE — ASSESSMENT & PLAN NOTE
- Repeat TSH is planned this week, as ordered by Endocrine. Patient will forward result via [employer].   - Continue levothyroxine 50 mcg PO daily.

## 2024-02-21 NOTE — PROGRESS NOTES
"Routine Prenatal Visit  [facility] - [place] [address], [address], [place] [address]    Assessment/Plan:  Nimco is a 33 y.o. year old  at 30w2d who presents for routine prenatal visit.     1. Maternal care due to low transverse uterine scar from previous  delivery  Assessment & Plan:  - Leaning toward TOLAC, but still considering. Growth US normal this week.       2. Hypothyroid in pregnancy, antepartum  Assessment & Plan:  - Repeat TSH is planned this week, as ordered by Endocrine. Patient will forward result via [employer].   - Continue levothyroxine 50 mcg PO daily.       3. Group B streptococcal carriage complicating pregnancy    4. Choroid plexus cyst of fetus affecting care of mother, antepartum, single or unspecified fetus    5. 30 weeks gestation of pregnancy  Assessment & Plan:  - PTL/PPROM/Bleeding precautions given. Kick counts reviewed.  - Third trimester labs reviewed.  - Recommendation for administration of TDaP was reviewed. TDaP administered today. Discussed recommendation for partner and close family members to ensure up-to-date on TDaP vaccination prior to delivery.   - Problem list updated, results console reviewed and updated with pertinent prenatal labs.  - PMH, PSH, medications reviewed and updated as needed  - Return to office in 2 wk(s) for routine prenatal care      Orders:  -     POCT urine dip    6. Need for Tdap vaccination  -     TDAP VACCINE GREATER THAN OR EQUAL TO 8YO IM          Subjective:   Nimco Good is a 33 y.o.  who presents for routine prenatal care at 30w2d.  Complaints today: No  LOF: No; VB: No; Contractions: No; FM: Present    Objective:  /60 (BP Location: Right arm, Patient Position: Sitting, Cuff Size: Standard)   Ht 5' 4\" (1.626 m)   Wt 76.7 kg (169 lb)   LMP 2023   BMI 29.01 kg/m²     General: Well appearing, no distress  Respiratory: Unlabored breathing  Cardiovascular: Regular rate.  Abdomen: Soft, gravid, " nontender  Extremities: Warm and well perfused.  Non tender.    Pregravid Weight/BMI: 59.9 kg (132 lb) (BMI 22.65)  Current Weight: 76.7 kg (169 lb)   Total Weight Gain: 16.8 kg (37 lb)     Pre-David Vitals      Flowsheet Row Most Recent Value   Prenatal Assessment    Fetal Heart Rate 140   Fundal Height (cm) 30 cm   Movement Present   Prenatal Vitals    Blood Pressure 106/60   Weight - Scale 76.7 kg (169 lb)   Urine Albumin/Glucose    Dilation/Effacement/Station    Vaginal Drainage    Draining Fluid No   Edema    LLE Edema None   RLE Edema None   Facial Edema None             Claude Lei MD  2024 4:39 PM

## 2024-03-04 ENCOUNTER — ROUTINE PRENATAL (OUTPATIENT)
Dept: OBGYN CLINIC | Facility: CLINIC | Age: 34
End: 2024-03-04
Payer: COMMERCIAL

## 2024-03-04 VITALS — WEIGHT: 170.4 LBS | DIASTOLIC BLOOD PRESSURE: 54 MMHG | SYSTOLIC BLOOD PRESSURE: 90 MMHG | BODY MASS INDEX: 29.25 KG/M2

## 2024-03-04 DIAGNOSIS — O34.211 MATERNAL CARE DUE TO LOW TRANSVERSE UTERINE SCAR FROM PREVIOUS CESAREAN DELIVERY: ICD-10-CM

## 2024-03-04 DIAGNOSIS — O99.280 HYPOTHYROID IN PREGNANCY, ANTEPARTUM: ICD-10-CM

## 2024-03-04 DIAGNOSIS — E03.9 HYPOTHYROID IN PREGNANCY, ANTEPARTUM: ICD-10-CM

## 2024-03-04 DIAGNOSIS — O99.820 GROUP B STREPTOCOCCAL CARRIAGE COMPLICATING PREGNANCY: ICD-10-CM

## 2024-03-04 DIAGNOSIS — Z3A.32 32 WEEKS GESTATION OF PREGNANCY: Primary | ICD-10-CM

## 2024-03-04 DIAGNOSIS — O35.03X0 CHOROID PLEXUS CYST OF FETUS AFFECTING CARE OF MOTHER, ANTEPARTUM, SINGLE OR UNSPECIFIED FETUS: ICD-10-CM

## 2024-03-04 LAB
SL AMB  POCT GLUCOSE, UA: NEGATIVE
SL AMB POCT URINE PROTEIN: NEGATIVE

## 2024-03-04 PROCEDURE — 81002 URINALYSIS NONAUTO W/O SCOPE: CPT | Performed by: OBSTETRICS & GYNECOLOGY

## 2024-03-04 PROCEDURE — PNV: Performed by: OBSTETRICS & GYNECOLOGY

## 2024-03-04 NOTE — PROGRESS NOTES
Routine Prenatal Visit  [facility] - [place] [address], [address], [place] [address]    Assessment/Plan:  Nimco is a 33 y.o. year old  at 32w0d who presents for routine prenatal visit.     1. 32 weeks gestation of pregnancy  -     POCT urine dip    2. Maternal care due to low transverse uterine scar from previous  delivery    3. Hypothyroid in pregnancy, antepartum    4. Group B streptococcal carriage complicating pregnancy    5. Choroid plexus cyst of fetus affecting care of mother, antepartum, single or unspecified fetus        Next OB Visit 2 weeks.    Subjective:     CC: Prenatal care    Nimco Good is a 33 y.o.  female who presents for routine prenatal care at 32w0d.  Pregnancy ROS: no leakage of fluid, pelvic pain, or vaginal bleeding.  normal fetal movement.    The following portions of the patient's history were reviewed and updated as appropriate: allergies, current medications, past family history, past medical history, obstetric history, gynecologic history, past social history, past surgical history and problem list.      Objective:  BP 90/54   Wt 77.3 kg (170 lb 6.4 oz)   LMP 2023   BMI 29.25 kg/m²   Pregravid Weight/BMI: 59.9 kg (132 lb) (BMI 22.65)  Current Weight: 77.3 kg (170 lb 6.4 oz)   Total Weight Gain: 17.4 kg (38 lb 6.4 oz)   Pre- Vitals      Flowsheet Row Most Recent Value   Prenatal Assessment    Fetal Heart Rate 140   Fundal Height (cm) 32 cm   Movement Present   Prenatal Vitals    Blood Pressure 90/54   Weight - Scale 77.3 kg (170 lb 6.4 oz)   Urine Albumin/Glucose    Dilation/Effacement/Station    Vaginal Drainage    Draining Fluid No   Edema              General: Well appearing, no distress  Abdomen: Soft, gravid, nontender  Extremities: Non tender.

## 2024-03-15 ENCOUNTER — TELEPHONE (OUTPATIENT)
Age: 34
End: 2024-03-15

## 2024-03-21 ENCOUNTER — ROUTINE PRENATAL (OUTPATIENT)
Dept: OBGYN CLINIC | Facility: CLINIC | Age: 34
End: 2024-03-21
Payer: COMMERCIAL

## 2024-03-21 VITALS — DIASTOLIC BLOOD PRESSURE: 58 MMHG | WEIGHT: 174 LBS | BODY MASS INDEX: 29.87 KG/M2 | SYSTOLIC BLOOD PRESSURE: 98 MMHG

## 2024-03-21 DIAGNOSIS — E03.9 HYPOTHYROID IN PREGNANCY, ANTEPARTUM: ICD-10-CM

## 2024-03-21 DIAGNOSIS — O99.820 GROUP B STREPTOCOCCAL CARRIAGE COMPLICATING PREGNANCY: ICD-10-CM

## 2024-03-21 DIAGNOSIS — O99.280 HYPOTHYROID IN PREGNANCY, ANTEPARTUM: ICD-10-CM

## 2024-03-21 DIAGNOSIS — Z3A.34 34 WEEKS GESTATION OF PREGNANCY: Primary | ICD-10-CM

## 2024-03-21 DIAGNOSIS — O34.211 MATERNAL CARE DUE TO LOW TRANSVERSE UTERINE SCAR FROM PREVIOUS CESAREAN DELIVERY: ICD-10-CM

## 2024-03-21 LAB
SL AMB  POCT GLUCOSE, UA: NORMAL
SL AMB POCT URINE PROTEIN: NORMAL

## 2024-03-21 PROCEDURE — PNV: Performed by: NURSE PRACTITIONER

## 2024-03-21 PROCEDURE — 81002 URINALYSIS NONAUTO W/O SCOPE: CPT | Performed by: NURSE PRACTITIONER

## 2024-03-21 NOTE — PROGRESS NOTES
Routine Prenatal Visit  [facility] - [place] [address], [address], [place] [address]    Assessment/Plan:  Nimco is a 33 y.o. year old  at 34w3d who presents for routine prenatal visit.     1. 34 weeks gestation of pregnancy  -     POCT urine dip    2. Hypothyroid in pregnancy, antepartum  Followed by Endocrine, states no further labs needed.  3. Maternal care due to low transverse uterine scar from previous  delivery  Assessment & Plan:  2024 EFW 28th percentile  Desires TOLAC      4. Group B streptococcal carriage complicating pregnancy      Reviewed s/s ptl, fmc, call with any concerns  Next OB Visit 2 weeks.    Subjective:     CC: Prenatal care    Nimco Good is a 33 y.o.  female who presents for routine prenatal care at 34w3d. Feels well, no complaints.  Pregnancy ROS: no leakage of fluid, pelvic pain, or vaginal bleeding.  normal fetal movement.    The following portions of the patient's history were reviewed and updated as appropriate: allergies, current medications, past family history, past medical history, obstetric history, gynecologic history, past social history, past surgical history and problem list.      Objective:  BP 98/58   Wt 78.9 kg (174 lb)   LMP 2023   BMI 29.87 kg/m²   Pregravid Weight/BMI: 59.9 kg (132 lb) (BMI 22.65)  Current Weight: 78.9 kg (174 lb)   Total Weight Gain: 19.1 kg (42 lb)   Pre-David Vitals      Flowsheet Row Most Recent Value   Prenatal Assessment    Fetal Heart Rate 142   Fundal Height (cm) 32 cm   Movement Present   Presentation Vertex   Prenatal Vitals    Blood Pressure 98/58   Weight - Scale 78.9 kg (174 lb)   Urine Albumin/Glucose    Dilation/Effacement/Station    Vaginal Drainage    Draining Fluid No   Edema    LLE Edema None   RLE Edema None   Facial Edema None             General: Well appearing, no distress  Abdomen: Soft, gravid, nontender  Extremities: Non tender.

## 2024-03-25 ENCOUNTER — NURSE TRIAGE (OUTPATIENT)
Age: 34
End: 2024-03-25

## 2024-03-25 NOTE — TELEPHONE ENCOUNTER
"Nimco has noticed increase white/yellow thick mucous discharge over the past week. Denies vaginal odor or irritation. Denies vaginal bleeding or spotting. Reassured, increase mucous vaginal discharge is normal as body prepares for delivery.   .  Can discuss further at 36 week.(GBS culture) appt next week.  Continue to monitor and call back with any additional questions or if develops vaginal odor or irritation prior to 4/2/2024 OB visit. Patient in agreement to plan.   Answer Assessment - Initial Assessment Questions  1. DISCHARGE: \"Describe the discharge.\" (e.g., white, yellow, green, gray, foamy, cottage cheese-like)      Thick white/yellow mucous  2. ODOR: \"Is there a bad odor?\"      denies  3. ONSET: \"When did the discharge begin?\"      Over the past week. Amount of discharge has increased  4. RASH: \"Is there a rash in that area?\" If Yes, ask: \"Describe it.\" (e.g., redness, blisters, sores, bumps)      denies  5. ABDOMINAL PAIN: \"Are you having any abdominal pain?\" If Yes, ask: \"What does it feel like?\" (e.g., crampy, dull, intermittent, constant)       Denies increase in pain, pelvic bones are achey  6. ABDOMINAL PAIN SEVERITY: If present, ask: \"How bad is it?\"  (e.g., Scale 1-10; mild, moderate, or severe)    - MILD (1-3): doesn't interfere with normal activities, abdomen soft and not tender to touch     - MODERATE (4-7): interferes with normal activities or awakens from sleep, tender to touch     - SEVERE (8-10): excruciating pain, doubled over, unable to do any normal activities      kassidy  7. CAUSE: \"What do you think is causing the discharge?\"      unsrue  8. OTHER SYMPTOMS: \"Do you have any other symptoms?\" (e.g., fever, itching, vaginal bleeding, pain with urination)      No bleeding. Denies itch  9. KEELY: \"What date are you expecting to deliver?\"       04/29/20204  10. PREGNANCY: \"How many weeks pregnant are you?\"        35w,0d    Protocols used: Pregnancy - Vaginal Discharge-ADULT-[place]    "

## 2024-04-01 PROBLEM — Z3A.36 36 WEEKS GESTATION OF PREGNANCY: Status: ACTIVE | Noted: 2023-09-20

## 2024-04-02 ENCOUNTER — ROUTINE PRENATAL (OUTPATIENT)
Dept: OBGYN CLINIC | Facility: CLINIC | Age: 34
End: 2024-04-02

## 2024-04-02 VITALS
BODY MASS INDEX: 30.87 KG/M2 | DIASTOLIC BLOOD PRESSURE: 62 MMHG | HEIGHT: 64 IN | WEIGHT: 180.8 LBS | SYSTOLIC BLOOD PRESSURE: 90 MMHG

## 2024-04-02 DIAGNOSIS — Z3A.36 36 WEEKS GESTATION OF PREGNANCY: ICD-10-CM

## 2024-04-02 DIAGNOSIS — E03.9 HYPOTHYROID IN PREGNANCY, ANTEPARTUM, THIRD TRIMESTER: ICD-10-CM

## 2024-04-02 DIAGNOSIS — O34.211 MATERNAL CARE DUE TO LOW TRANSVERSE UTERINE SCAR FROM PREVIOUS CESAREAN DELIVERY: Primary | ICD-10-CM

## 2024-04-02 DIAGNOSIS — O99.283 HYPOTHYROID IN PREGNANCY, ANTEPARTUM, THIRD TRIMESTER: ICD-10-CM

## 2024-04-02 DIAGNOSIS — O99.820 GROUP B STREPTOCOCCAL CARRIAGE COMPLICATING PREGNANCY: ICD-10-CM

## 2024-04-02 PROCEDURE — PNV: Performed by: OBSTETRICS & GYNECOLOGY

## 2024-04-02 NOTE — PROGRESS NOTES
"Routine Prenatal Visit  [facility] - [place] [address], [address], [place] [address]    Assessment/Plan:  Nimco is a 33 y.o. year old  at 36w1d who presents for routine prenatal visit.     1. Maternal care due to low transverse uterine scar from previous  delivery  Assessment & Plan:  Patient had been planning TOLAC but now as getting closer to delivery,  thinking about scheduled .  Reviewed pros and cons of TOLAC vs repeat LTCS, no contraindication to TOLAC.  She is worried about needing an emergency .  Reviewed option to schedule  at 39, 40 or 41 weeks.  She wishes to do this - will have surgical scheduler call her.        2. Hypothyroid in pregnancy, antepartum, third trimester  Assessment & Plan:  Normal testing with endocrinology, last 2024.  Continue levothyroxine.      3. Group B streptococcal carriage complicating pregnancy  Assessment & Plan:  Does not need 36 week testing.  Will treat in labor.  No allergies.      4. 36 weeks gestation of pregnancy  Assessment & Plan:  Pt with mucousy vaginal discharge.  No odor or pain.  Offered pelvic exam today, she declined          Next OB Visit 1 weeks.    Subjective:     CC: Prenatal care    Nimco Good is a 33 y.o.  female who presents for routine prenatal care at 36w1d.  Pregnancy ROS: no leakage of fluid, pelvic pain, or vaginal bleeding.  normal fetal movement.    The following portions of the patient's history were reviewed and updated as appropriate: allergies, current medications, past family history, past medical history, obstetric history, gynecologic history, past social history, past surgical history and problem list.      Objective:  BP 90/62   Ht 5' 4\" (1.626 m)   Wt 82 kg (180 lb 12.8 oz)   LMP 2023   BMI 31.03 kg/m²   Pregravid Weight/BMI: 59.9 kg (132 lb) (BMI 22.65)  Current Weight: 82 kg (180 lb 12.8 oz)   Total Weight Gain: 22.1 kg (48 lb 12.8 oz)   Pre- " Vitals      Flowsheet Row Most Recent Value   Prenatal Assessment    Fetal Heart Rate 145   Fundal Height (cm) 36 cm   Movement Present   Presentation Vertex   Prenatal Vitals    Blood Pressure 90/62   Weight - Scale 82 kg (180 lb 12.8 oz)   Urine Albumin/Glucose    Dilation/Effacement/Station    Vaginal Drainage    Edema    LLE Edema Trace   RLE Edema Trace   Facial Edema None             General: Well appearing, no distress  Abdomen: Soft, gravid, nontender  Extremities: Non tender.

## 2024-04-02 NOTE — ASSESSMENT & PLAN NOTE
Patient had been planning TOLAC but now as getting closer to delivery,  thinking about scheduled .  Reviewed pros and cons of TOLAC vs repeat LTCS, no contraindication to TOLAC.  She is worried about needing an emergency .  Reviewed option to schedule  at 39, 40 or 41 weeks.  She wishes to do this - will have surgical scheduler call her.

## 2024-04-03 ENCOUNTER — TELEPHONE (OUTPATIENT)
Dept: OBGYN CLINIC | Facility: CLINIC | Age: 34
End: 2024-04-03

## 2024-04-03 NOTE — TELEPHONE ENCOUNTER
----- Message from Sofia Dunn MD sent at 4/2/2024  5:32 PM EDT -----  Regarding: please call to schedule repeat LTCS  [facility] would like [facility] if not in labor spontaneously.  She can schedule anytime after 39 weeks.

## 2024-04-10 ENCOUNTER — ROUTINE PRENATAL (OUTPATIENT)
Dept: OBGYN CLINIC | Facility: CLINIC | Age: 34
End: 2024-04-10
Payer: COMMERCIAL

## 2024-04-10 VITALS — DIASTOLIC BLOOD PRESSURE: 60 MMHG | SYSTOLIC BLOOD PRESSURE: 98 MMHG | WEIGHT: 180.4 LBS | BODY MASS INDEX: 30.97 KG/M2

## 2024-04-10 DIAGNOSIS — O34.211 MATERNAL CARE DUE TO LOW TRANSVERSE UTERINE SCAR FROM PREVIOUS CESAREAN DELIVERY: Primary | ICD-10-CM

## 2024-04-10 DIAGNOSIS — Z3A.37 37 WEEKS GESTATION OF PREGNANCY: ICD-10-CM

## 2024-04-10 DIAGNOSIS — E03.9 HYPOTHYROID IN PREGNANCY, ANTEPARTUM, THIRD TRIMESTER: ICD-10-CM

## 2024-04-10 DIAGNOSIS — O99.820 GROUP B STREPTOCOCCAL CARRIAGE COMPLICATING PREGNANCY: ICD-10-CM

## 2024-04-10 DIAGNOSIS — O35.03X0 CHOROID PLEXUS CYST OF FETUS AFFECTING CARE OF MOTHER, ANTEPARTUM, SINGLE OR UNSPECIFIED FETUS: ICD-10-CM

## 2024-04-10 DIAGNOSIS — O99.283 HYPOTHYROID IN PREGNANCY, ANTEPARTUM, THIRD TRIMESTER: ICD-10-CM

## 2024-04-10 PROCEDURE — PNV: Performed by: STUDENT IN AN ORGANIZED HEALTH CARE EDUCATION/TRAINING PROGRAM

## 2024-04-10 PROCEDURE — 81002 URINALYSIS NONAUTO W/O SCOPE: CPT | Performed by: STUDENT IN AN ORGANIZED HEALTH CARE EDUCATION/TRAINING PROGRAM

## 2024-04-10 NOTE — ASSESSMENT & PLAN NOTE
- Repeat  section scheduled for 39 weeks, per patient request. She would plan trial of labor if she enters spontaneous labor prior.

## 2024-04-10 NOTE — ASSESSMENT & PLAN NOTE
- Labor/Bleeding/ROM precautions given. Kick counts reviewed.  - GBS positive result reviewed.   - Problem list updated, results console reviewed and updated with pertinent prenatal labs.  - PMH, PSH, medications reviewed and updated as needed  - Return to office in 1 wk(s) for routine prenatal care

## 2024-04-10 NOTE — PROGRESS NOTES
Routine Prenatal Visit  [facility] [address], [address], [place] [address]    Assessment/Plan:  Nimco is a 33 y.o. year old  at 37w2d who presents for routine prenatal visit.     1. Maternal care due to low transverse uterine scar from previous  delivery  Assessment & Plan:  - Repeat  section scheduled for 39 weeks, per patient request. She would plan trial of labor if she enters spontaneous labor prior.       2. Hypothyroid in pregnancy, antepartum, third trimester  Assessment & Plan:  - TSH at goal. Continue levothyroxine.       3. Group B streptococcal carriage complicating pregnancy  Assessment & Plan:  - Plan for routine intrapartum prophylaxis with penicillin in the case of trial of labor.       4. Choroid plexus cyst of fetus affecting care of mother, antepartum, single or unspecified fetus    5. 37 weeks gestation of pregnancy  Assessment & Plan:  - Labor/Bleeding/ROM precautions given. Kick counts reviewed.  - GBS positive result reviewed.   - Problem list updated, results console reviewed and updated with pertinent prenatal labs.  - PMH, PSH, medications reviewed and updated as needed  - Return to office in 1 wk(s) for routine prenatal care      Orders:  -     POCT urine dip          Subjective:   Nimco Good is a 33 y.o.  who presents for routine prenatal care at 37w2d.  Complaints today: No  LOF: No; VB: No; Contractions: No; FM: Present and normal    Objective:  BP 98/60   Wt 81.8 kg (180 lb 6.4 oz)   LMP 2023   BMI 30.97 kg/m²     General: Well appearing, no distress  Respiratory: Unlabored breathing  Cardiovascular: Regular rate.  Abdomen: Soft, gravid, nontender  Extremities: Warm and well perfused.  Non tender.    Pregravid Weight/BMI: 59.9 kg (132 lb) (BMI 22.65)  Current Weight: 81.8 kg (180 lb 6.4 oz)   Total Weight Gain: 22 kg (48 lb 6.4 oz)     Pre-David Vitals      Flowsheet Row Most Recent Value   Prenatal Assessment     Fetal Heart Rate 135   Fundal Height (cm) 37 cm   Movement Present   Presentation Vertex   Prenatal Vitals    Blood Pressure 98/60   Weight - Scale 81.8 kg (180 lb 6.4 oz)   Urine Albumin/Glucose    Dilation/Effacement/Station    Vaginal Drainage    Draining Fluid No   Edema    LLE Edema None   RLE Edema None   Facial Edema None             Claude Lei MD  4/10/2024 9:50 AM

## 2024-04-17 ENCOUNTER — ROUTINE PRENATAL (OUTPATIENT)
Dept: OBGYN CLINIC | Facility: CLINIC | Age: 34
End: 2024-04-17
Payer: COMMERCIAL

## 2024-04-17 VITALS
SYSTOLIC BLOOD PRESSURE: 100 MMHG | BODY MASS INDEX: 30.9 KG/M2 | DIASTOLIC BLOOD PRESSURE: 60 MMHG | WEIGHT: 181 LBS | HEIGHT: 64 IN

## 2024-04-17 DIAGNOSIS — O99.820 GROUP B STREPTOCOCCAL CARRIAGE COMPLICATING PREGNANCY: ICD-10-CM

## 2024-04-17 DIAGNOSIS — Z3A.38 38 WEEKS GESTATION OF PREGNANCY: ICD-10-CM

## 2024-04-17 DIAGNOSIS — E03.9 HYPOTHYROID IN PREGNANCY, ANTEPARTUM, THIRD TRIMESTER: ICD-10-CM

## 2024-04-17 DIAGNOSIS — O34.211 MATERNAL CARE DUE TO LOW TRANSVERSE UTERINE SCAR FROM PREVIOUS CESAREAN DELIVERY: Primary | ICD-10-CM

## 2024-04-17 DIAGNOSIS — Z36.89 ENCOUNTER FOR OTHER SPECIFIED ANTENATAL SCREENING: ICD-10-CM

## 2024-04-17 DIAGNOSIS — O99.283 HYPOTHYROID IN PREGNANCY, ANTEPARTUM, THIRD TRIMESTER: ICD-10-CM

## 2024-04-17 LAB
SL AMB  POCT GLUCOSE, UA: NORMAL
SL AMB POCT URINE PROTEIN: NORMAL

## 2024-04-17 PROCEDURE — 81002 URINALYSIS NONAUTO W/O SCOPE: CPT | Performed by: OBSTETRICS & GYNECOLOGY

## 2024-04-17 PROCEDURE — PNV: Performed by: OBSTETRICS & GYNECOLOGY

## 2024-04-17 NOTE — ASSESSMENT & PLAN NOTE
C/S scheduled for 4/26/24. If labor starts prior she may consider TOLAC. Instructions and surgical prewash given

## 2024-04-17 NOTE — PROGRESS NOTES
"Routine Prenatal Visit  [facility] - [place] [address], [place] [address]    Assessment/Plan:  Nimco is a 33 y.o. year old  at 38w2d who presents for routine prenatal visit.     1. Maternal care due to low transverse uterine scar from previous  delivery  Assessment & Plan:  C/S scheduled for 24. If labor starts prior she may consider TOLAC. Instructions and surgical prewash given      2. Hypothyroid in pregnancy, antepartum, third trimester    3. Group B streptococcal carriage complicating pregnancy    4. 38 weeks gestation of pregnancy    5. Encounter for other specified  screening  -     POCT urine dip          Subjective:     CC: Prenatal care    Nimco Good is a 33 y.o.  female who presents for routine prenatal care at 38w2d.  Pregnancy ROS: no leakage of fluid, pelvic pain, or vaginal bleeding.  normal fetal movement.    The following portions of the patient's history were reviewed and updated as appropriate: allergies, current medications, past family history, past medical history, obstetric history, gynecologic history, past social history, past surgical history and problem list.      Objective:  /60 (BP Location: Left arm, Patient Position: Sitting, Cuff Size: Standard)   Ht 5' 4\" (1.626 m)   Wt 82.1 kg (181 lb)   LMP 2023   BMI 31.07 kg/m²   Pregravid Weight/BMI: 59.9 kg (132 lb) (BMI 22.65)  Current Weight: 82.1 kg (181 lb)   Total Weight Gain: 22.2 kg (49 lb)   Pre-David Vitals      Flowsheet Row Most Recent Value   Prenatal Assessment    Fetal Heart Rate 155   Fundal Height (cm) 38 cm   Movement Present   Presentation Vertex   Prenatal Vitals    Blood Pressure 100/60   Weight - Scale 82.1 kg (181 lb)   Urine Albumin/Glucose    Dilation/Effacement/Station    Vaginal Drainage    Edema              General: Well appearing, no distress  Respiratory: Unlabored breathing  Cardiovascular: Regular rate.  Abdomen: Soft, " gravid, nontender  Fundal Height: Appropriate for gestational age.  Extremities: Warm and well perfused.  Non tender.

## 2024-04-22 ENCOUNTER — ROUTINE PRENATAL (OUTPATIENT)
Dept: OBGYN CLINIC | Facility: CLINIC | Age: 34
End: 2024-04-22
Payer: COMMERCIAL

## 2024-04-22 VITALS
SYSTOLIC BLOOD PRESSURE: 92 MMHG | DIASTOLIC BLOOD PRESSURE: 60 MMHG | BODY MASS INDEX: 30.97 KG/M2 | HEIGHT: 64 IN | WEIGHT: 181.4 LBS

## 2024-04-22 DIAGNOSIS — Z3A.39 39 WEEKS GESTATION OF PREGNANCY: Primary | ICD-10-CM

## 2024-04-22 DIAGNOSIS — O35.03X0 CHOROID PLEXUS CYST OF FETUS AFFECTING CARE OF MOTHER, ANTEPARTUM, SINGLE OR UNSPECIFIED FETUS: ICD-10-CM

## 2024-04-22 DIAGNOSIS — O99.820 GROUP B STREPTOCOCCAL CARRIAGE COMPLICATING PREGNANCY: ICD-10-CM

## 2024-04-22 DIAGNOSIS — E03.9 HYPOTHYROID IN PREGNANCY, ANTEPARTUM, THIRD TRIMESTER: ICD-10-CM

## 2024-04-22 DIAGNOSIS — O99.283 HYPOTHYROID IN PREGNANCY, ANTEPARTUM, THIRD TRIMESTER: ICD-10-CM

## 2024-04-22 DIAGNOSIS — O34.211 MATERNAL CARE DUE TO LOW TRANSVERSE UTERINE SCAR FROM PREVIOUS CESAREAN DELIVERY: ICD-10-CM

## 2024-04-22 LAB
SL AMB  POCT GLUCOSE, UA: NORMAL
SL AMB POCT URINE PROTEIN: NORMAL

## 2024-04-22 PROCEDURE — 81002 URINALYSIS NONAUTO W/O SCOPE: CPT | Performed by: OBSTETRICS & GYNECOLOGY

## 2024-04-22 PROCEDURE — PNV: Performed by: OBSTETRICS & GYNECOLOGY

## 2024-04-22 NOTE — PROGRESS NOTES
"Routine Prenatal Visit  [facility] - [place] [address], [address], [place] [address]    Assessment/Plan:  Nimco is a 33 y.o. year old  at 39w0d who presents for routine prenatal visit.     1. 39 weeks gestation of pregnancy  -     POCT urine dip        -    informed patient of findings on cervical check (3/80/-).      2. Maternal care due to low transverse uterine scar from previous  delivery       -  Patient desires TOLAC if spontaneous labor occurs prior to scheduled repeat C/S.  Si/Sx of labor reviewed    3. Hypothyroid in pregnancy, antepartum, third trimester    4. Group B streptococcal carriage complicating pregnancy    5. Choroid plexus cyst of fetus affecting care of mother, antepartum, single or unspecified fetus    Next Visit  post-partum    Subjective:     CC: Prenatal care    Nimco Good is a 33 y.o.  female who presents for routine prenatal care at 39w0d.  Pregnancy ROS: no leakage of fluid, pelvic pain, or vaginal bleeding.  normal fetal movement.    The following portions of the patient's history were reviewed and updated as appropriate: allergies, current medications, past family history, past medical history, obstetric history, gynecologic history, past social history, past surgical history and problem list.    Objective:  BP 92/60 (BP Location: Right arm, Patient Position: Sitting, Cuff Size: Standard)   Ht 5' 4\" (1.626 m)   Wt 82.3 kg (181 lb 6.4 oz)   LMP 2023   BMI 31.14 kg/m²   Pregravid Weight/BMI: 59.9 kg (132 lb) (BMI 22.65)  Current Weight: 82.3 kg (181 lb 6.4 oz)   Total Weight Gain: 22.4 kg (49 lb 6.4 oz)   Pre-David Vitals      Flowsheet Row Most Recent Value   Prenatal Assessment    Fetal Heart Rate 150   Fundal Height (cm) 39 cm   Movement Present   Presentation Vertex   Prenatal Vitals    Blood Pressure 92/60   Weight - Scale 82.3 kg (181 lb 6.4 oz)   Urine Albumin/Glucose    Dilation/Effacement/Station    Cervical Dilation " 3.5   Cervical Effacement 80   Fetal Station -1   Vaginal Drainage    Draining Fluid No   Edema              General: Well appearing, no distress  Abdomen: Soft, gravid, nontender  Extremities: Non tender.

## 2024-04-24 ENCOUNTER — TELEPHONE (OUTPATIENT)
Age: 34
End: 2024-04-24

## 2024-04-24 NOTE — TELEPHONE ENCOUNTER
Incoming call from patient. States she is 39.2 weeks pregnant and has c section scheduled on Friday 4/26. Patient states she has a sinus infection which she was prescribed a z pack for. Patient was told safe in pregnancy, but wanted to make sure ok to take before c section. Advised will confirm with provider and patient will receive call or MC back. Patient verbalized understanding. No further questions at this time.

## 2024-04-24 NOTE — TELEPHONE ENCOUNTER
Yes, okay in pregnancy and encourage treatment prior to .  If she feels she would like to move  to following week if she is having a lot of symptoms, we can try to accommodate that.  Please let me know and I will call L&D to see what days next week may be available.  If symptoms have improved by Friday, I would proceed and continue Z pack as prescribed.

## 2024-04-24 NOTE — TELEPHONE ENCOUNTER
Spoke with Nimco, reassured her can take Z-pack during pregnancy. She wants to let Dr. Dunn know about Sinus infection.

## 2024-04-25 PROCEDURE — NC001 PR NO CHARGE: Performed by: OBSTETRICS & GYNECOLOGY

## 2024-04-25 NOTE — H&P
History & Physical - OB/GYN   Nimco Good 33 y.o. female MRN: [id number]  Unit/Bed#:  Encounter: [id number]    33 y.o. yo  at 39w3d with KEELY of 2024, by Last Menstrual Period.  She will be 39w4d at the time of her scheduled .    She is a patient of [facility].    Chief complaint:  Scheduled     HPI:  33 y.o. yo  at 39w3d with Estimated Date of Delivery: 24 for  history prior LTCS .      Pregnancy Complications:   Problems (from 23 to present)       Problem Noted Resolved    Choroid plexus cysts, fetal, affecting care of mother, antepartum 2023 by Margie Miranda MD No    Overview Addendum 3/21/2024  9:12 AM by VANESSA Barahona     Isolated  left CPC seen on anatomy on . NIPS subsequently drawn and resulted with low risk for aneuploidy.   2024 CPC resolved         Group B streptococcal carriage complicating pregnancy 10/17/2023 by Medina Villalba PA-C No    Overview Signed 10/17/2023  8:58 AM by Medina Villalba PA-C     Less than 10,000 CFU on initial prenatal urine.   Will treat at delivery.          Maternal care due to low transverse uterine scar from previous  delivery 2023 by Bird Fofana MD No    Overview Addendum 2024  5:37 PM by Sofia Dunn MD     Performed after failed vacuum attempt. Repeat recommended.    1/10/2024: Following discussion with MFM at [facility], patient is considering TOLAC. We reviewed again the factors that affect her likelihood of successful . We also reviewed risks and benefits of TOLAC versus repeat  section prior to onset of labor versus potential need for urgent/emergent  section while laboring. Per [facility]  calculator, her predicted  success rate is 55.7% She wishes to await assessment of fetal growth in the 3rd trimester and review plan further at that time.     2024 - now thinking about scheduled .  Reviewed pros and  cons of TOLAC vs repeat LTCS, no contraindication to TOLAC.  Will scheduled back up LTCS just in case.         39 weeks gestation of pregnancy 2023 by Bird Fofana MD No    Overview Signed 2024  5:33 PM by Sofia Dunn MD     Adacel 2024         Hypothyroid in pregnancy, antepartum, third trimester 2023 by Bird Fofana MD No    Overview Addendum 4/10/2024  9:45 AM by Claude Lei MD     Follows with endocrine at [place]    TSH trend:   2023: 1.23 (seen scanned document)  2024 TSH 1.07 (scanned to Media)                     PMH:  Past Medical History:   Diagnosis Date    Hypothyroid     Follows Dr. Phillips/Aileen    IBS (irritable bowel syndrome)        PSH:  Past Surgical History:   Procedure Laterality Date     SECTION  2021       Social Hx:  Social History     Tobacco Use    Smoking status: Former     Current packs/day: 0.00     Types: Cigarettes     Quit date:      Years since quitting: 10.3     Passive exposure: Never    Smokeless tobacco: Never   Vaping Use    Vaping status: Never Used   Substance Use Topics    Alcohol use: Yes     Comment: socially    Drug use: Never     Comment: Denies        OB Hx:  OB History    Para Term  AB Living   2 1 1 0 0 1   SAB IAB Ectopic Multiple Live Births   0 0 0 0 1      # Outcome Date GA Lbr Regulo/2nd Weight Sex Delivery Anes PTL Lv   2 Current            1 Term 21 40w1d  3833 g (8 lb 7.2 oz) M CS-LTranv EPI N CHRISTA      Birth Comments: arrest descent after 2 hours pushing, failed attempt vacuum      Name: Yan      Obstetric Comments   Menarche: 13       Meds:  No current facility-administered medications on file prior to encounter.     Current Outpatient Medications on File Prior to Encounter   Medication Sig Dispense Refill    Levothyroxine Sodium 50 MCG CAPS Take 1 tablet by mouth daily Taking 50 mcg daily      Prenatal Vit-Fe Sulfate-FA-DHA (PRENATAL VITAMIN/MIN +DHA PO) Take by mouth       Psyllium (Fiber) 28.3 % POWD Take by mouth in the morning         Allergies:  Allergies   Allergen Reactions    Lactose - Food Allergy Other (See Comments)     unknown       Labs:  ABO Grouping   Date Value Ref Range Status   10/05/2023 A  Final      Rh Type   Date Value Ref Range Status   10/05/2023 Positive  Final     Comment:     Please note: Prior records for this patient's ABO / Rh type are not  available for additional verification.        Rh Type   Date Value Ref Range Status   10/05/2023 Positive  Final     Comment:     Please note: Prior records for this patient's ABO / Rh type are not  available for additional verification.       HIV-1/HIV-2 AB   Date Value Ref Range Status   10/05/2023 Non-Reactive  Final     Hepatitis B Surface Ag   Date Value Ref Range Status   10/05/2023 negative  Final     HEP C AB   Date Value Ref Range Status   10/05/2023 Non Reactive Non Reactive Final     RPR   Date Value Ref Range Status   2024 Non Reactive Non Reactive Final     External Rubella IGG Quantitation   Date Value Ref Range Status   10/05/2023 immune  Final     Glucose   Date Value Ref Range Status   2024 122 70 - 139 mg/dL Final     Comment:     According to ADA, a glucose threshold of >139 mg/dL after 50-gram  load identifies approximately 80% of women with gestational  diabetes mellitus, while the sensitivity is further increased to  approximately 90% by a threshold of >129 mg/dL.         Strep Grp B SULMA   Date Value Ref Range Status   2021 Positive (A) Negative Final     Comment:     Centers for Disease Control and Prevention (CDC) and American Congress  of Obstetricians and Gynecologists (ACOG) guidelines for prevention of   group B streptococcal (GBS) disease specify co-collection of  a vaginal and rectal swab specimen to maximize sensitivity of GBS  detection. Per the CDC and ACOG, swabbing both the lower vagina and  rectum substantially increases the yield of detection compared  with  sampling the vagina alone.  Penicillin G, ampicillin, or cefazolin are indicated for intrapartum  prophylaxis of  GBS colonization. Reflex susceptibility  testing should be performed prior to use of clindamycin only on GBS  isolates from penicillin-allergic women who are considered a high risk  for anaphylaxis. Treatment with vancomycin without additional testing  is warranted if resistance to clindamycin is noted.         Physical Exam:  Vitals and physical exam will be done day of admission      Assessment:   33 y.o.  at 39w3d weeks presents for repeat  due to  history prior    She will be 39w4d at the time of her scheduled .    Plan:   1. Admit to L&D  2. Place IV and IV fluids  3. Labs: CBC, RPR, type and screen  4. Continue NPO  5. Fetal monitoring and toco  6. Anesthesia evaluation  7. Preop Antibiotics ordered    Maternal care due to low transverse uterine scar from previous  delivery  - patient has declined TOLAC after counseling in office  - wishes repeat LTCS    Hypothyroid in pregnancy, antepartum, third trimester  - on levothyroxine in pregnancy - TSH testing normal  - will continue dosing postpartum  - pt to f/u with endocrinology in postpartum period             Sofia Dunn MD

## 2024-04-25 NOTE — ASSESSMENT & PLAN NOTE
- patient has declined TOLAC after counseling in office  - reaffirms her decision today and wishes repeat LTCS

## 2024-04-25 NOTE — ASSESSMENT & PLAN NOTE
- on levothyroxine in pregnancy - TSH testing normal  - will continue dosing postpartum  - pt to f/u with endocrinology in postpartum period

## 2024-04-26 ENCOUNTER — HOSPITAL ENCOUNTER (INPATIENT)
Facility: HOSPITAL | Age: 34
LOS: 3 days | Discharge: HOME | End: 2024-04-29
Attending: OBSTETRICS & GYNECOLOGY | Admitting: OBSTETRICS & GYNECOLOGY
Payer: COMMERCIAL

## 2024-04-26 ENCOUNTER — ANESTHESIA (INPATIENT)
Dept: LABOR AND DELIVERY | Facility: HOSPITAL | Age: 34
End: 2024-04-26
Payer: COMMERCIAL

## 2024-04-26 ENCOUNTER — ANESTHESIA EVENT (INPATIENT)
Dept: LABOR AND DELIVERY | Facility: HOSPITAL | Age: 34
End: 2024-04-26
Payer: COMMERCIAL

## 2024-04-26 DIAGNOSIS — O99.820 GROUP B STREPTOCOCCAL CARRIAGE COMPLICATING PREGNANCY: ICD-10-CM

## 2024-04-26 DIAGNOSIS — O99.283 HYPOTHYROID IN PREGNANCY, ANTEPARTUM, THIRD TRIMESTER: ICD-10-CM

## 2024-04-26 DIAGNOSIS — O34.211 MATERNAL CARE DUE TO LOW TRANSVERSE UTERINE SCAR FROM PREVIOUS CESAREAN DELIVERY: ICD-10-CM

## 2024-04-26 DIAGNOSIS — E03.9 HYPOTHYROID IN PREGNANCY, ANTEPARTUM, THIRD TRIMESTER: ICD-10-CM

## 2024-04-26 DIAGNOSIS — N81.10 PELVIC ORGAN PROLAPSE QUANTIFICATION STAGE 1 CYSTOCELE: ICD-10-CM

## 2024-04-26 DIAGNOSIS — Z3A.38 38 WEEKS GESTATION OF PREGNANCY: ICD-10-CM

## 2024-04-26 DIAGNOSIS — Z98.891 S/P REPEAT LOW TRANSVERSE C-SECTION: Primary | ICD-10-CM

## 2024-04-26 PROBLEM — J32.9 SINUS INFECTION: Status: ACTIVE | Noted: 2024-04-26

## 2024-04-26 PROBLEM — J06.9 URI (UPPER RESPIRATORY INFECTION): Status: ACTIVE | Noted: 2024-04-26

## 2024-04-26 LAB
ABO GROUP BLD: NORMAL
BASE EXCESS BLDCOA CALC-SCNC: -6.4 MMOL/L (ref 3–11)
BASE EXCESS BLDCOV CALC-SCNC: -5.8 MMOL/L (ref 1–9)
BLD GP AB SCN SERPL QL: NEGATIVE
ERYTHROCYTE [DISTWIDTH] IN BLOOD BY AUTOMATED COUNT: 11.9 % (ref 11.6–15.1)
HCO3 BLDCOA-SCNC: 20.3 MMOL/L (ref 17.3–27.3)
HCO3 BLDCOV-SCNC: 18.4 MMOL/L (ref 12.2–28.6)
HCT VFR BLD AUTO: 41.5 % (ref 34.8–46.1)
HGB BLD-MCNC: 14.1 G/DL (ref 11.5–15.4)
HOLD SPECIMEN: NORMAL
MCH RBC QN AUTO: 31.9 PG (ref 26.8–34.3)
MCHC RBC AUTO-ENTMCNC: 34 G/DL (ref 31.4–37.4)
MCV RBC AUTO: 94 FL (ref 82–98)
O2 CT VFR BLDCOA CALC: 7.4 ML/DL
OXYHGB MFR BLDCOA: 34.9 %
OXYHGB MFR BLDCOV: 72.3 %
PCO2 BLDCOA: 44.5 MM[HG] (ref 30–60)
PCO2 BLDCOV: 32.9 MM HG (ref 27–43)
PH BLDCOA: 7.28 [PH] (ref 7.23–7.43)
PH BLDCOV: 7.37 [PH] (ref 7.19–7.49)
PLATELET # BLD AUTO: 250 THOUSANDS/UL (ref 149–390)
PMV BLD AUTO: 10.8 FL (ref 8.9–12.7)
PO2 BLDCOA: 17.5 MM HG (ref 5–25)
PO2 BLDCOV: 31.2 MM HG (ref 15–45)
RBC # BLD AUTO: 4.42 MILLION/UL (ref 3.81–5.12)
RH BLD: POSITIVE
SAO2 % BLDCOV: 15.6 ML/DL
SPECIMEN EXPIRATION DATE: NORMAL
TREPONEMA PALLIDUM IGG+IGM AB [PRESENCE] IN SERUM OR PLASMA BY IMMUNOASSAY: NORMAL
WBC # BLD AUTO: 8.49 THOUSAND/UL (ref 4.31–10.16)

## 2024-04-26 PROCEDURE — 4A1HXCZ MONITORING OF PRODUCTS OF CONCEPTION, CARDIAC RATE, EXTERNAL APPROACH: ICD-10-PCS | Performed by: OBSTETRICS & GYNECOLOGY

## 2024-04-26 PROCEDURE — 86850 RBC ANTIBODY SCREEN: CPT | Performed by: OBSTETRICS & GYNECOLOGY

## 2024-04-26 PROCEDURE — 82805 BLOOD GASES W/O2 SATURATION: CPT | Performed by: OBSTETRICS & GYNECOLOGY

## 2024-04-26 PROCEDURE — 86900 BLOOD TYPING SEROLOGIC ABO: CPT | Performed by: OBSTETRICS & GYNECOLOGY

## 2024-04-26 PROCEDURE — 86780 TREPONEMA PALLIDUM: CPT | Performed by: OBSTETRICS & GYNECOLOGY

## 2024-04-26 PROCEDURE — 86901 BLOOD TYPING SEROLOGIC RH(D): CPT | Performed by: OBSTETRICS & GYNECOLOGY

## 2024-04-26 PROCEDURE — 59510 CESAREAN DELIVERY: CPT | Performed by: OBSTETRICS & GYNECOLOGY

## 2024-04-26 PROCEDURE — 85027 COMPLETE CBC AUTOMATED: CPT | Performed by: OBSTETRICS & GYNECOLOGY

## 2024-04-26 RX ORDER — AZITHROMYCIN 250 MG/1
250 TABLET, FILM COATED ORAL EVERY 24 HOURS
Status: COMPLETED | OUTPATIENT
Start: 2024-04-26 | End: 2024-04-26

## 2024-04-26 RX ORDER — DIPHENHYDRAMINE HYDROCHLORIDE 50 MG/ML
25 INJECTION INTRAMUSCULAR; INTRAVENOUS EVERY 6 HOURS PRN
Status: DISCONTINUED | OUTPATIENT
Start: 2024-04-26 | End: 2024-04-29 | Stop reason: HOSPADM

## 2024-04-26 RX ORDER — DOCUSATE SODIUM 100 MG/1
100 CAPSULE, LIQUID FILLED ORAL 2 TIMES DAILY
Status: DISCONTINUED | OUTPATIENT
Start: 2024-04-26 | End: 2024-04-29 | Stop reason: HOSPADM

## 2024-04-26 RX ORDER — MAGNESIUM HYDROXIDE/ALUMINUM HYDROXICE/SIMETHICONE 120; 1200; 1200 MG/30ML; MG/30ML; MG/30ML
15 SUSPENSION ORAL EVERY 6 HOURS PRN
Status: DISCONTINUED | OUTPATIENT
Start: 2024-04-26 | End: 2024-04-29 | Stop reason: HOSPADM

## 2024-04-26 RX ORDER — MORPHINE SULFATE 0.5 MG/ML
INJECTION, SOLUTION EPIDURAL; INTRATHECAL; INTRAVENOUS AS NEEDED
Status: DISCONTINUED | OUTPATIENT
Start: 2024-04-26 | End: 2024-04-26

## 2024-04-26 RX ORDER — ONDANSETRON 2 MG/ML
4 INJECTION INTRAMUSCULAR; INTRAVENOUS EVERY 6 HOURS PRN
Status: DISPENSED | OUTPATIENT
Start: 2024-04-26 | End: 2024-04-27

## 2024-04-26 RX ORDER — SODIUM CHLORIDE, SODIUM LACTATE, POTASSIUM CHLORIDE, CALCIUM CHLORIDE 600; 310; 30; 20 MG/100ML; MG/100ML; MG/100ML; MG/100ML
125 INJECTION, SOLUTION INTRAVENOUS CONTINUOUS
Status: DISCONTINUED | OUTPATIENT
Start: 2024-04-26 | End: 2024-04-26

## 2024-04-26 RX ORDER — CEFAZOLIN SODIUM 2 G/50ML
2000 SOLUTION INTRAVENOUS ONCE
Status: COMPLETED | OUTPATIENT
Start: 2024-04-26 | End: 2024-04-26

## 2024-04-26 RX ORDER — OXYTOCIN/RINGER'S LACTATE 30/500 ML
125 PLASTIC BAG, INJECTION (ML) INTRAVENOUS ONCE
Status: DISCONTINUED | OUTPATIENT
Start: 2024-04-26 | End: 2024-04-29 | Stop reason: HOSPADM

## 2024-04-26 RX ORDER — LEVOTHYROXINE SODIUM 50 UG/1
50 CAPSULE ORAL DAILY
Status: DISCONTINUED | OUTPATIENT
Start: 2024-04-26 | End: 2024-04-27

## 2024-04-26 RX ORDER — BUPIVACAINE HYDROCHLORIDE 7.5 MG/ML
INJECTION, SOLUTION INTRASPINAL AS NEEDED
Status: DISCONTINUED | OUTPATIENT
Start: 2024-04-26 | End: 2024-04-26

## 2024-04-26 RX ORDER — NALOXONE HYDROCHLORIDE 0.4 MG/ML
0.1 INJECTION, SOLUTION INTRAMUSCULAR; INTRAVENOUS; SUBCUTANEOUS
Status: ACTIVE | OUTPATIENT
Start: 2024-04-26 | End: 2024-04-27

## 2024-04-26 RX ORDER — ACETAMINOPHEN 325 MG/1
650 TABLET ORAL EVERY 6 HOURS SCHEDULED
Status: DISCONTINUED | OUTPATIENT
Start: 2024-04-26 | End: 2024-04-29 | Stop reason: HOSPADM

## 2024-04-26 RX ORDER — OXYCODONE HYDROCHLORIDE 5 MG/1
5 TABLET ORAL EVERY 4 HOURS PRN
Status: DISCONTINUED | OUTPATIENT
Start: 2024-04-27 | End: 2024-04-29 | Stop reason: HOSPADM

## 2024-04-26 RX ORDER — AZITHROMYCIN 250 MG/1
250 TABLET, FILM COATED ORAL EVERY 24 HOURS
Status: COMPLETED | OUTPATIENT
Start: 2024-04-27 | End: 2024-04-28

## 2024-04-26 RX ORDER — OXYTOCIN/RINGER'S LACTATE 30/500 ML
PLASTIC BAG, INJECTION (ML) INTRAVENOUS CONTINUOUS PRN
Status: DISCONTINUED | OUTPATIENT
Start: 2024-04-26 | End: 2024-04-26

## 2024-04-26 RX ORDER — KETOROLAC TROMETHAMINE 30 MG/ML
INJECTION, SOLUTION INTRAMUSCULAR; INTRAVENOUS AS NEEDED
Status: DISCONTINUED | OUTPATIENT
Start: 2024-04-26 | End: 2024-04-26

## 2024-04-26 RX ORDER — SIMETHICONE 80 MG
80 TABLET,CHEWABLE ORAL 4 TIMES DAILY PRN
Status: DISCONTINUED | OUTPATIENT
Start: 2024-04-26 | End: 2024-04-29 | Stop reason: HOSPADM

## 2024-04-26 RX ORDER — ONDANSETRON 2 MG/ML
4 INJECTION INTRAMUSCULAR; INTRAVENOUS EVERY 8 HOURS PRN
Status: DISCONTINUED | OUTPATIENT
Start: 2024-04-26 | End: 2024-04-26

## 2024-04-26 RX ORDER — KETOROLAC TROMETHAMINE 30 MG/ML
30 INJECTION, SOLUTION INTRAMUSCULAR; INTRAVENOUS EVERY 6 HOURS SCHEDULED
Status: COMPLETED | OUTPATIENT
Start: 2024-04-26 | End: 2024-04-27

## 2024-04-26 RX ORDER — EPHEDRINE SULFATE 50 MG/ML
INJECTION INTRAVENOUS AS NEEDED
Status: DISCONTINUED | OUTPATIENT
Start: 2024-04-26 | End: 2024-04-26

## 2024-04-26 RX ORDER — SODIUM CHLORIDE, SODIUM LACTATE, POTASSIUM CHLORIDE, CALCIUM CHLORIDE 600; 310; 30; 20 MG/100ML; MG/100ML; MG/100ML; MG/100ML
125 INJECTION, SOLUTION INTRAVENOUS CONTINUOUS
Status: DISCONTINUED | OUTPATIENT
Start: 2024-04-26 | End: 2024-04-29 | Stop reason: HOSPADM

## 2024-04-26 RX ORDER — NALBUPHINE HYDROCHLORIDE 10 MG/ML
5 INJECTION, SOLUTION INTRAMUSCULAR; INTRAVENOUS; SUBCUTANEOUS
Status: ACTIVE | OUTPATIENT
Start: 2024-04-26 | End: 2024-04-27

## 2024-04-26 RX ORDER — ONDANSETRON 2 MG/ML
INJECTION INTRAMUSCULAR; INTRAVENOUS AS NEEDED
Status: DISCONTINUED | OUTPATIENT
Start: 2024-04-26 | End: 2024-04-26

## 2024-04-26 RX ORDER — ONDANSETRON 2 MG/ML
4 INJECTION INTRAMUSCULAR; INTRAVENOUS EVERY 8 HOURS PRN
Status: DISCONTINUED | OUTPATIENT
Start: 2024-04-27 | End: 2024-04-29 | Stop reason: HOSPADM

## 2024-04-26 RX ORDER — BISACODYL 10 MG
10 SUPPOSITORY, RECTAL RECTAL DAILY PRN
Status: DISCONTINUED | OUTPATIENT
Start: 2024-04-26 | End: 2024-04-29 | Stop reason: HOSPADM

## 2024-04-26 RX ORDER — IBUPROFEN 600 MG/1
600 TABLET ORAL EVERY 6 HOURS
Status: DISCONTINUED | OUTPATIENT
Start: 2024-04-27 | End: 2024-04-29 | Stop reason: HOSPADM

## 2024-04-26 RX ORDER — DEXAMETHASONE SODIUM PHOSPHATE 10 MG/ML
INJECTION, SOLUTION INTRAMUSCULAR; INTRAVENOUS AS NEEDED
Status: DISCONTINUED | OUTPATIENT
Start: 2024-04-26 | End: 2024-04-26

## 2024-04-26 RX ADMIN — EPHEDRINE SULFATE 5 MG: 50 INJECTION INTRAVENOUS at 12:09

## 2024-04-26 RX ADMIN — KETOROLAC TROMETHAMINE 30 MG: 30 INJECTION, SOLUTION INTRAMUSCULAR; INTRAVENOUS at 23:49

## 2024-04-26 RX ADMIN — BUPIVACAINE HYDROCHLORIDE IN DEXTROSE 1.6 ML: 7.5 INJECTION, SOLUTION SUBARACHNOID at 11:58

## 2024-04-26 RX ADMIN — KETOROLAC TROMETHAMINE 30 MG: 30 INJECTION, SOLUTION INTRAMUSCULAR; INTRAVENOUS at 17:09

## 2024-04-26 RX ADMIN — DOCUSATE SODIUM 100 MG: 100 CAPSULE, LIQUID FILLED ORAL at 17:57

## 2024-04-26 RX ADMIN — ACETAMINOPHEN 650 MG: 325 TABLET, FILM COATED ORAL at 14:56

## 2024-04-26 RX ADMIN — SODIUM CHLORIDE, SODIUM LACTATE, POTASSIUM CHLORIDE, AND CALCIUM CHLORIDE 1000 ML: .6; .31; .03; .02 INJECTION, SOLUTION INTRAVENOUS at 09:02

## 2024-04-26 RX ADMIN — ONDANSETRON 4 MG: 2 INJECTION INTRAMUSCULAR; INTRAVENOUS at 12:09

## 2024-04-26 RX ADMIN — SODIUM CHLORIDE, SODIUM LACTATE, POTASSIUM CHLORIDE, AND CALCIUM CHLORIDE: .6; .31; .03; .02 INJECTION, SOLUTION INTRAVENOUS at 12:29

## 2024-04-26 RX ADMIN — MORPHINE SULFATE 1 MG: 0.5 INJECTION, SOLUTION EPIDURAL; INTRATHECAL; INTRAVENOUS at 12:21

## 2024-04-26 RX ADMIN — MORPHINE SULFATE 1.35 MG: 0.5 INJECTION, SOLUTION EPIDURAL; INTRATHECAL; INTRAVENOUS at 12:25

## 2024-04-26 RX ADMIN — CEFAZOLIN SODIUM 2000 MG: 2 SOLUTION INTRAVENOUS at 11:56

## 2024-04-26 RX ADMIN — MORPHINE SULFATE 1 MG: 0.5 INJECTION, SOLUTION EPIDURAL; INTRATHECAL; INTRAVENOUS at 12:19

## 2024-04-26 RX ADMIN — SODIUM CHLORIDE, SODIUM LACTATE, POTASSIUM CHLORIDE, AND CALCIUM CHLORIDE 125 ML/HR: .6; .31; .03; .02 INJECTION, SOLUTION INTRAVENOUS at 10:02

## 2024-04-26 RX ADMIN — ACETAMINOPHEN 650 MG: 325 TABLET, FILM COATED ORAL at 21:38

## 2024-04-26 RX ADMIN — SODIUM CHLORIDE, SODIUM LACTATE, POTASSIUM CHLORIDE, AND CALCIUM CHLORIDE 999 ML/HR: .6; .31; .03; .02 INJECTION, SOLUTION INTRAVENOUS at 22:14

## 2024-04-26 RX ADMIN — SIMETHICONE 80 MG: 80 TABLET, CHEWABLE ORAL at 17:13

## 2024-04-26 RX ADMIN — ONDANSETRON 4 MG: 2 INJECTION INTRAMUSCULAR; INTRAVENOUS at 23:13

## 2024-04-26 RX ADMIN — DEXAMETHASONE SODIUM PHOSPHATE 10 MG: 10 INJECTION, SOLUTION INTRAMUSCULAR; INTRAVENOUS at 12:11

## 2024-04-26 RX ADMIN — KETOROLAC TROMETHAMINE 30 MG: 30 INJECTION, SOLUTION INTRAMUSCULAR; INTRAVENOUS at 12:25

## 2024-04-26 RX ADMIN — PHENYLEPHRINE HYDROCHLORIDE 40 MCG/MIN: 10 INJECTION, SOLUTION INTRAVENOUS at 11:59

## 2024-04-26 RX ADMIN — MORPHINE SULFATE 1.5 MG: 0.5 INJECTION, SOLUTION EPIDURAL; INTRATHECAL; INTRAVENOUS at 12:26

## 2024-04-26 RX ADMIN — AZITHROMYCIN MONOHYDRATE 250 MG: 250 TABLET ORAL at 17:57

## 2024-04-26 RX ADMIN — Medication 250 MILLI-UNITS/MIN: at 12:15

## 2024-04-26 RX ADMIN — SODIUM CHLORIDE, SODIUM LACTATE, POTASSIUM CHLORIDE, AND CALCIUM CHLORIDE 125 ML/HR: .6; .31; .03; .02 INJECTION, SOLUTION INTRAVENOUS at 23:21

## 2024-04-26 RX ADMIN — EPHEDRINE SULFATE 10 MG: 50 INJECTION INTRAVENOUS at 12:12

## 2024-04-26 RX ADMIN — MORPHINE SULFATE 0.15 MG: 0.5 INJECTION, SOLUTION EPIDURAL; INTRATHECAL; INTRAVENOUS at 11:58

## 2024-04-26 NOTE — ANESTHESIA PROCEDURE NOTES
Spinal Block    Patient location during procedure: OR  Start time: 4/26/2024 11:58 AM  Reason for block: at surgeon's request, post-op pain management and primary anesthetic  Staffing  Performed by: Adrienne Foster CRNA  Authorized by: Nadir Sandy MD    Preanesthetic Checklist  Completed: patient identified, IV checked, site marked, risks and benefits discussed, surgical consent, monitors and equipment checked, pre-op evaluation and timeout performed  Spinal Block  Patient position: sitting  Prep: ChloraPrep and site prepped and draped  Patient monitoring: frequent blood pressure checks, continuous pulse ox and heart rate  Approach: midline  Location: L3-4  Needle  Needle type: Pencan   Needle gauge: 25 G  Needle length: 4 in  Assessment  Sensory level: T4  Injection Assessment:  negative aspiration for heme, no paresthesia on injection and positive aspiration for clear CSF.  Post-procedure:  site cleaned

## 2024-04-26 NOTE — INTERVAL H&P NOTE
"H&P Addendum:    Reviewed H&P in chart and only change is new Sinus infection see A/P below, confirmed procedure repeat LTCS.    Pt examined this morning with following findings.    Physical Exam:  /71 (BP Location: Right arm)   Pulse 85   Temp 97.7 °F (36.5 °C) (Temporal)   Resp 17   Ht 5' 4\" (1.626 m)   Wt 82.3 kg (181 lb 6.4 oz)   LMP 2023   SpO2 98%   BMI 31.14 kg/m²     Gen: alert, no acute distress  Cards: regular rate  Resp: unlabored breathing  Abdomen: gravid, non-tender, non-distended  Extremities: non-tender, no edema    Estimated Fetal Weight: 8.5 lbs  Presentation: cephalic    SVE:     deferred    FHT:  Baseline Rate (FHR): 125 bpm  Variability: Moderate  Accelerations: 15 x 15 or greater  Decelerations: None  TOCO:   Contraction Frequency (minutes): na  Contraction Duration (seconds): 0    Membranes: intact    Lab Results   Component Value Date    WBC 8.49 2024    HGB 14.1 2024    HCT 41.5 2024    MCV 94 2024     2024     No results found for: \"ABORH\"    Maternal care due to low transverse uterine scar from previous  delivery  - patient has declined TOLAC after counseling in office  - wishes repeat LTCS    Hypothyroid in pregnancy, antepartum, third trimester  - on levothyroxine in pregnancy - TSH testing normal  - will continue dosing postpartum  - pt to f/u with endocrinology in postpartum period    Sinus infection  - dx with sinus infection 2024 and started on Azithromycin  - she states has 3 doses left - 250mg daily today through   - some nasal congestion, anesthesia aware  "

## 2024-04-26 NOTE — PLAN OF CARE
Problem: ANTEPARTUM  Goal: Maintain pregnancy as long as maternal and/or fetal condition is stable  Description: INTERVENTIONS:  - Maternal surveillance  - Fetal surveillance  - Monitor uterine activity  - Medications as ordered  - Bedrest  2024 by Clover Holt RN  Outcome: Progressing  2024 by Clover Holt RN  Outcome: Progressing     Problem: BIRTH - VAGINAL/ SECTION  Goal: Fetal and maternal status remain reassuring during the birth process  Description: INTERVENTIONS:  - Monitor vital signs  - Monitor fetal heart rate  - Monitor uterine activity  - Monitor labor progression (vaginal delivery)  - DVT prophylaxis  - Antibiotic prophylaxis  2024 by Clover Holt RN  Outcome: Progressing  2024 by Clover Holt RN  Outcome: Progressing  Goal: Emotionally satisfying birthing experience for mother/fetus  Description: Interventions:  - Assess, plan, implement and evaluate the nursing care given to the patient in labor  - Advocate the philosophy that each childbirth experience is a unique experience and support the family's chosen level of involvement and control during the labor process   - Actively participate in both the patient's and family's teaching of the birth process  - Consider cultural, [employer]y[place]ian and age-specific factors and plan care for the patient in labor  2024 by Clover Holt RN  Outcome: Progressing  2024 by Clover Holt RN  Outcome: Progressing     Problem: PAIN - ADULT  Goal: Verbalizes/displays adequate comfort level or baseline comfort level  Description: Interventions:  - Encourage patient to monitor pain and request assistance  - Assess pain using appropriate pain scale  - Administer analgesics based on type and severity of pain and evaluate response  - Implement non-pharmacological measures as appropriate and evaluate response  - Consider cultural and social influences on pain and pain management  - Notify  physician/advanced practitioner if interventions unsuccessful or patient reports new pain  4/26/2024 0859 by Clover Holt RN  Outcome: Progressing  4/26/2024 0859 by Clover Holt RN  Outcome: Progressing     Problem: INFECTION - ADULT  Goal: Absence or prevention of progression during hospitalization  Description: INTERVENTIONS:  - Assess and monitor for signs and symptoms of infection  - Monitor lab/diagnostic results  - Monitor all insertion sites, i.e. indwelling lines, tubes, and drains  - Monitor endotracheal if appropriate and nasal secretions for changes in amount and color  - Jackson appropriate cooling/warming therapies per order  - Administer medications as ordered  - Instruct and encourage patient and family to use good hand hygiene technique  - Identify and instruct in appropriate isolation precautions for identified infection/condition  4/26/2024 0859 by Clover Holt RN  Outcome: Progressing  4/26/2024 0859 by Clover Holt RN  Outcome: Progressing  Goal: Absence of fever/infection during neutropenic period  Description: INTERVENTIONS:  - Monitor WBC    4/26/2024 0859 by Clover Holt RN  Outcome: Progressing  4/26/2024 0859 by Clover Holt RN  Outcome: Progressing     Problem: SAFETY ADULT  Goal: Patient will remain free of falls  Description: INTERVENTIONS:  - Educate patient/family on patient safety including physical limitations  - Instruct patient to call for assistance with activity   - Consult OT/PT to assist with strengthening/mobility   - Keep Call bell within reach  - Keep bed low and locked with side rails adjusted as appropriate  - Keep care items and personal belongings within reach  - Initiate and maintain comfort rounds  - Make Fall Risk Sign visible to staff  - Apply yellow socks and bracelet for high fall risk patients  - Consider moving patient to room near nurses station  4/26/2024 0859 by Clover Holt RN  Outcome: Progressing  4/26/2024 0859 by Clover  MAXIME Holt  Outcome: Progressing  Goal: Maintain or return to baseline ADL function  Description: INTERVENTIONS:  -  Assess patient's ability to carry out ADLs; assess patient's baseline for ADL function and identify physical deficits which impact ability to perform ADLs (bathing, care of mouth/teeth, toileting, grooming, dressing, etc.)  - Assess/evaluate cause of self-care deficits   - Assess range of motion  - Assess patient's mobility; develop plan if impaired  - Assess patient's need for assistive devices and provide as appropriate  - Encourage maximum independence but intervene and supervise when necessary  - Involve family in performance of ADLs  - Assess for home care needs following discharge   - Consider OT consult to assist with ADL evaluation and planning for discharge  - Provide patient education as appropriate  4/26/2024 0859 by Clover Holt RN  Outcome: Progressing  4/26/2024 0859 by Clover Holt RN  Outcome: Progressing  Goal: Maintains/Returns to pre admission functional level  Description: INTERVENTIONS:  - Perform AM-PAC 6 Click Basic Mobility/ Daily Activity assessment daily.  - Set and communicate daily mobility goal to care team and patient/family/caregiver.   - Collaborate with rehabilitation services on mobility goals if consulte  - Out of bed for toileting  - Record patient progress and toleration of activity level   4/26/2024 0859 by Clover Holt RN  Outcome: Progressing  4/26/2024 0859 by Clover Holt RN  Outcome: Progressing     Problem: DISCHARGE PLANNING  Goal: Discharge to home or other facility with appropriate resources  Description: INTERVENTIONS:  - Identify barriers to discharge w/patient and caregiver  - Arrange for needed discharge resources and transportation as appropriate  - Identify discharge learning needs (meds, wound care, etc.)  - Arrange for interpretive services to assist at discharge as needed  - Refer to Case Management Department for coordinating  discharge planning if the patient needs post-hospital services based on physician/advanced practitioner order or complex needs related to functional status, cognitive ability, or social support system  4/26/2024 0859 by Clover Holt RN  Outcome: Progressing  4/26/2024 0859 by Clover Holt RN  Outcome: Progressing     Problem: Knowledge Deficit  Goal: Patient/family/caregiver demonstrates understanding of disease process, treatment plan, medications, and discharge instructions  Description: Complete learning assessment and assess knowledge base.  Interventions:  - Provide teaching at level of understanding  - Provide teaching via preferred learning methods  4/26/2024 0859 by Clover Holt RN  Outcome: Progressing  4/26/2024 0859 by Clover Holt RN  Outcome: Progressing

## 2024-04-26 NOTE — ANESTHESIA PREPROCEDURE EVALUATION
Procedure:   SECTION () REPEAT (Uterus)    Relevant Problems   ANESTHESIA (within normal limits)      ENDO   (+) Hypothyroid in pregnancy, antepartum, third trimester     Labs:   Results from last 7 days   Lab Units 24  0852   WBC Thousand/uL 8.49   HEMOGLOBIN g/dL 14.1   HEMATOCRIT % 41.5   PLATELETS Thousands/uL 250     Type and Screen:  Results from last 7 days   Lab Units 24   ABO GROUPING  A   RH FACTOR  Positive   ANTIBODY SCREEN  Negative   SPECIMEN EXPIRATION DATE  2024      Physical Exam    Airway    Mallampati score: I  TM Distance: >3 FB  Neck ROM: full     Dental   No notable dental hx     Cardiovascular  Cardiovascular exam normal    Pulmonary  Pulmonary exam normal     Other Findings  post-pubertal.      Anesthesia Plan  ASA Score- 2     Anesthesia Type- regional with ASA Monitors.         Additional Monitors:     Airway Plan:     Comment: I discussed with patient the risks(reviewed with patient on the anesthesia consent form), benefits and alternatives of regional anesthesia also including possibilities of infection, temporary paralysis and nerve injury.  I answered patient questions and obtained consent.  Planned regional anesthesia spinal with duramorph for postoperative pain control    General Anesthesia consented for failed spinal or emergency  .       Plan Factors-    Chart reviewed.   Existing labs reviewed. Patient summary reviewed.                  Induction-     Postoperative Plan- Plan for postoperative opioid use.     Informed Consent- Anesthetic plan and risks discussed with patient and spouse.  I personally reviewed this patient with the CRNA. Discussed and agreed on the Anesthesia Plan with the CRNA..

## 2024-04-26 NOTE — PLAN OF CARE
Problem: ANTEPARTUM  Goal: Maintain pregnancy as long as maternal and/or fetal condition is stable  Description: INTERVENTIONS:  - Maternal surveillance  - Fetal surveillance  - Monitor uterine activity  - Medications as ordered  - Bedrest  Outcome: Completed     Problem: BIRTH - VAGINAL/ SECTION  Goal: Fetal and maternal status remain reassuring during the birth process  Description: INTERVENTIONS:  - Monitor vital signs  - Monitor fetal heart rate  - Monitor uterine activity  - Monitor labor progression (vaginal delivery)  - DVT prophylaxis  - Antibiotic prophylaxis  Outcome: Completed  Goal: Emotionally satisfying birthing experience for mother/fetus  Description: Interventions:  - Assess, plan, implement and evaluate the nursing care given to the patient in labor  - Advocate the philosophy that each childbirth experience is a unique experience and support the family's chosen level of involvement and control during the labor process   - Actively participate in both the patient's and family's teaching of the birth process  - Consider cultural, Anabaptist and age-specific factors and plan care for the patient in labor  Outcome: Completed     Problem: POSTPARTUM  Goal: Experiences normal postpartum course  Description: INTERVENTIONS:  - Monitor maternal vital signs  - Assess uterine involution and lochia  Outcome: Progressing  Goal: Appropriate maternal -  bonding  Description: INTERVENTIONS:  - Identify family support  - Assess for appropriate maternal/infant bonding   -Encourage maternal/infant bonding opportunities  - Referral to  or  as needed  Outcome: Progressing  Goal: Establishment of infant feeding pattern  Description: INTERVENTIONS:  - Assess breast/bottle feeding  - Refer to lactation as needed  Outcome: Progressing  Goal: Incision(s), wounds(s) or drain site(s) healing without S/S of infection  Description: INTERVENTIONS  - Assess and document dressing, incision,  wound bed, drain sites and surrounding tissue  - Provide patient and family education    Outcome: Progressing     Problem: PAIN - ADULT  Goal: Verbalizes/displays adequate comfort level or baseline comfort level  Description: Interventions:  - Encourage patient to monitor pain and request assistance  - Assess pain using appropriate pain scale  - Administer analgesics based on type and severity of pain and evaluate response  - Implement non-pharmacological measures as appropriate and evaluate response  - Consider cultural and social influences on pain and pain management  - Notify physician/advanced practitioner if interventions unsuccessful or patient reports new pain  Outcome: Progressing     Problem: INFECTION - ADULT  Goal: Absence or prevention of progression during hospitalization  Description: INTERVENTIONS:  - Assess and monitor for signs and symptoms of infection  - Monitor lab/diagnostic results  - Monitor all insertion sites, i.e. indwelling lines, tubes, and drains  - Monitor endotracheal if appropriate and nasal secretions for changes in amount and color  - [place] appropriate cooling/warming therapies per order  - Administer medications as ordered  - Instruct and encourage patient and family to use good hand hygiene technique  - Identify and instruct in appropriate isolation precautions for identified infection/condition  Outcome: Progressing  Goal: Absence of fever/infection during neutropenic period  Description: INTERVENTIONS:  - Monitor WBC    Outcome: Progressing     Problem: SAFETY ADULT  Goal: Patient will remain free of falls  Description: INTERVENTIONS:  - Educate patient/family on patient safety including physical limitations  - Instruct patient to call for assistance with activity   - Consult OT/PT to assist with strengthening/mobility   - Keep Call bell within reach  - Keep bed low and locked with side rails adjusted as appropriate  - Keep care items and personal belongings within reach  -  Initiate and maintain comfort rounds    Outcome: Progressing  Goal: Maintain or return to baseline ADL function  Description: INTERVENTIONS:  -  Assess patient's ability to carry out ADLs; assess patient's baseline for ADL function and identify physical deficits which impact ability to perform ADLs (bathing, care of mouth/teeth, toileting, grooming, dressing, etc.)  - Assess/evaluate cause of self-care deficits   - Assess range of motion  - Assess patient's mobility; develop plan if impaired  - Assess patient's need for assistive devices and provide as appropriate  - Encourage maximum independence but intervene and supervise when necessary  - Involve family in performance of ADLs  - Assess for home care needs following discharge   - Consider OT consult to assist with ADL evaluation and planning for discharge  - Provide patient education as appropriate  Outcome: Progressing  Goal: Maintains/Returns to pre admission functional level  Description: INTERVENTIONS:  - Perform AM-PAC 6 Click Basic Mobility/ Daily Activity assessment daily.  - Set and communicate daily mobility goal to care team and patient/family/caregiver.     Outcome: Progressing     Problem: DISCHARGE PLANNING  Goal: Discharge to home or other facility with appropriate resources  Description: INTERVENTIONS:  - Identify barriers to discharge w/patient and caregiver  - Arrange for needed discharge resources and transportation as appropriate  - Identify discharge learning needs (meds, wound care, etc.)  - Arrange for interpretive services to assist at discharge as needed  - Refer to Case Management Department for coordinating discharge planning if the patient needs post-hospital services based on physician/advanced practitioner order or complex needs related to functional status, cognitive ability, or social support system  Outcome: Progressing     Problem: Knowledge Deficit  Goal: Patient/family/caregiver demonstrates understanding of disease process,  treatment plan, medications, and discharge instructions  Description: Complete learning assessment and assess knowledge base.  Interventions:  - Provide teaching at level of understanding  - Provide teaching via preferred learning methods  Outcome: Progressing

## 2024-04-26 NOTE — OP NOTE
Section Operative Report - OB/GYN   Nimco Good 33 y.o. female MRN: [id number]  Unit/Bed#:  OR  Encounter: [id number]    Indications:  history prior     Pre-operative Diagnosis:   39w4d  History of prior     Post-operative Diagnosis: same, delivered    Procedure: repeat LTCS    Surgeon:  Sofia Dunn MD    Assistant(s): Danielle Reddy DO  No qualified surgical resident was available to assist in the case.  The assistance of an additional surgeon was critical for completion of the case. The assistant surgeon provided assistance with exposure, hemostasis, delivery of the infant, tissue manipulation, and suturing. The assistant surgeon was present from the start of the procedure until fascial closure. I, the primary surgeon, was present and scrubbed throughout the entirety of the case and performed all key portions of the surgical procedure.    Kevon Group Classification System:  No Multiple pregnancy, No Transverse or oblique lie, No Breech lie, Gestational age is > or =37 weeks, Multiparous, Previous uterine scar +  is KEVON GROUP 5    Findings:  1. Delivery of viable female on 24 at 12:15, weight 7bs 3oz;  Apgar scores of 8 at one minute and 9 at five minutes.   2. Normal appearing placenta with centrally-inserted 3 vessel cord  3. clear amniotic fluid  4. Grossly normal uterus, tubes, and ovaries  5. Adhesions: none    Specimens:   1. Arterial and venous cord gases  2. Cord blood  3. Placenta to storage     Estimated Blood Loss:  309 cc           Total IV Fluids: 1200 cc    Drains: Gonzalez catheter, draining clear yellow urine, 50 cc           Complications:  None; patient tolerated the procedure well.           Disposition:  PACU          Condition: stable    Procedure Details   Decision was made to proceed with  section due to history prior . Patient was made aware of these findings and the proposed plan. Risks, benefits,  possible complications, alternate treatment options, and expected outcomes were discussed with the patient.  The patient agreed with the proposed plan and gave informed consent.      The patient was taken to the operating room where she was properly identified to the OR staff and attending physician. She received spinal anesthesia in the OR prior to procedure.  Fetal heart tones were appreciated and found to be appropriate. A Gonzalez catheter was aseptically inserted and SCDs were placed.  An intravaginal betadine prep was performed.  The abdomen was prepped with Chloraprep and following appropriate drying time, the patient was draped in the usual sterile manner for a Pfannenstiel incision.  The patient had received Ancef 2g IV pre-operatively for prophylaxis.  A Time Out was held and the above information confirmed.  The patient was identified as Nimco Good and the procedure verified as  Delivery.    A Pfannenstiel incision was made and carried down through the underlying subcutaneous tissue to the fascia using a scalpel. Rectus fascia was then incised in the midline and extended laterally using curved Patel scissors. The inferior edge of the fascia was grasped with Kocher clamps and the underlying muscle was dissected with a combination of blunt and sharp dissection. The superior edge was grasped with Kocher clamps and cleared in similar fashion.  All anatomic layers were well-demarcated.    The rectus muscles were  in the midline and the peritoneum was identified and subsequently entered and extended longitudinally with blunt dissection. The vesicouterine peritoneum was identified and a bladder flap was created using Metzenbaum scissors and developed bluntly. The bladder blade was inserted.      A low transverse uterine incision was made with the scalpel and extended laterally with blunt dissection. The amnion was ruptured.  The surgeons hand was inserted through the hysterotomy and the  fetal head was palpated, elevated, and delivered through the uterine incision with the assistance of fundal pressure.  Baby had spontaneous cry with good color and tone.  After 30 seconds of delayed cord clamping, the umbilical cord was clamped and cut.  The infant was handed off to the  providers.  Arterial and venous cord gases and cord blood were obtained for evaluation and promptly sent to the lab.  The placenta delivered spontaneously with uterine fundal massage and was noted to have a centrally inserted 3 vessel cord.  This was also sent to the lab for storage.    The uterus was exteriorized and a lap sponge was used to clear the cavity of clots and products of conception. The uterine incision was closed with a running locked suture of 0 Vicryl. A second layer of the same suture was used to imbricate the first.  Good hemostasis was confirmed upon uterine closure.  The posterior culdesac was cleared of clots and debris and the uterus was returned to the abdomen.  The paracolic gutters were inspected and cleared of all clots and debris with moist lap sponges.  The uterine incision was examined again and hemostatic.  The fascia was closed with a running suture of 0 Vicryl.  The subcutaneous tissue was irrigated and reapproximated with 2-0 Plain Gut suture. The skin was closed with Insorb absorbable staples and the incision dressed with steri-strips.  Sterile dressing was applied.     At the conclusion of the procedure, all needle, sponge, and instrument counts were noted to be correct x3.  The patient tolerated the procedure well and was transferred to the PACU in stable condition.     Sofia Dunn MD

## 2024-04-26 NOTE — ANESTHESIA POSTPROCEDURE EVALUATION
Post-Op Assessment Note    CV Status:  Stable  Pain Score: 1 (shoulder pain)    Pain management: adequate       Mental Status:  Alert and awake   Hydration Status:  Euvolemic and stable   PONV Controlled:  None   Airway Patency:  Patent     Post Op Vitals Reviewed: Yes    No anethesia notable event occurred.    Staff: LENY               /58 (04/26/24 1246)    Temp 97.5 °F (36.4 °C) (04/26/24 1246)    Pulse 88 (04/26/24 1246)   Resp 16 (04/26/24 1246)    SpO2 97 % (04/26/24 1246)

## 2024-04-26 NOTE — ASSESSMENT & PLAN NOTE
- dx with sinus infection 4/24/2024 and started on Azithromycin  - she states has 3 doses left - 250mg daily today through Sunday  - some nasal congestion, anesthesia aware

## 2024-04-27 LAB
ERYTHROCYTE [DISTWIDTH] IN BLOOD BY AUTOMATED COUNT: 12 % (ref 11.6–15.1)
HCT VFR BLD AUTO: 39.3 % (ref 34.8–46.1)
HGB BLD-MCNC: 12.7 G/DL (ref 11.5–15.4)
MCH RBC QN AUTO: 31.8 PG (ref 26.8–34.3)
MCHC RBC AUTO-ENTMCNC: 32.3 G/DL (ref 31.4–37.4)
MCV RBC AUTO: 98 FL (ref 82–98)
PLATELET # BLD AUTO: 239 THOUSANDS/UL (ref 149–390)
PMV BLD AUTO: 10.7 FL (ref 8.9–12.7)
RBC # BLD AUTO: 4 MILLION/UL (ref 3.81–5.12)
WBC # BLD AUTO: 13.34 THOUSAND/UL (ref 4.31–10.16)

## 2024-04-27 PROCEDURE — 99024 POSTOP FOLLOW-UP VISIT: CPT | Performed by: OBSTETRICS & GYNECOLOGY

## 2024-04-27 PROCEDURE — 85027 COMPLETE CBC AUTOMATED: CPT | Performed by: OBSTETRICS & GYNECOLOGY

## 2024-04-27 RX ORDER — LEVOTHYROXINE SODIUM 0.05 MG/1
50 TABLET ORAL
Status: DISCONTINUED | OUTPATIENT
Start: 2024-04-27 | End: 2024-04-29 | Stop reason: HOSPADM

## 2024-04-27 RX ADMIN — AZITHROMYCIN MONOHYDRATE 250 MG: 250 TABLET ORAL at 12:26

## 2024-04-27 RX ADMIN — ACETAMINOPHEN 650 MG: 325 TABLET, FILM COATED ORAL at 15:49

## 2024-04-27 RX ADMIN — KETOROLAC TROMETHAMINE 30 MG: 30 INJECTION, SOLUTION INTRAMUSCULAR; INTRAVENOUS at 05:48

## 2024-04-27 RX ADMIN — DOCUSATE SODIUM 100 MG: 100 CAPSULE, LIQUID FILLED ORAL at 08:26

## 2024-04-27 RX ADMIN — SIMETHICONE 80 MG: 80 TABLET, CHEWABLE ORAL at 18:04

## 2024-04-27 RX ADMIN — ACETAMINOPHEN 650 MG: 325 TABLET, FILM COATED ORAL at 03:21

## 2024-04-27 RX ADMIN — KETOROLAC TROMETHAMINE 30 MG: 30 INJECTION, SOLUTION INTRAMUSCULAR; INTRAVENOUS at 12:16

## 2024-04-27 RX ADMIN — SIMETHICONE 80 MG: 80 TABLET, CHEWABLE ORAL at 05:57

## 2024-04-27 RX ADMIN — ACETAMINOPHEN 650 MG: 325 TABLET, FILM COATED ORAL at 08:26

## 2024-04-27 RX ADMIN — DOCUSATE SODIUM 100 MG: 100 CAPSULE, LIQUID FILLED ORAL at 17:59

## 2024-04-27 RX ADMIN — IBUPROFEN 600 MG: 600 TABLET, FILM COATED ORAL at 17:59

## 2024-04-27 RX ADMIN — ACETAMINOPHEN 650 MG: 325 TABLET, FILM COATED ORAL at 21:00

## 2024-04-27 RX ADMIN — LEVOTHYROXINE SODIUM 50 MCG: 50 TABLET ORAL at 06:00

## 2024-04-27 NOTE — PLAN OF CARE

## 2024-04-27 NOTE — PROGRESS NOTES
Post- Progress note    Patient is post-op day 1 from a  delivery.     Pain: controlled  Tolerating Oral Intake: yes  Voiding: yes  Flatus: yes  BM- no  Ambulating: yes  Breastfeeding:well  Shortness of Breath: no  Chest pain- no  Lochia: minimal    Objective:   Vitals:    24 0300   BP: 95/61   Pulse: 62   Resp: 18   Temp: 98.1 °F (36.7 °C)   SpO2:        Intake/Output Summary (Last 24 hours) at 2024 1034  Last data filed at 2024 0940  Gross per 24 hour   Intake 2925.97 ml   Output 1259 ml   Net 1666.97 ml       Physical Exam:  General: a/a/o x 3 no acute distress  Cardiovascular: RRR  Lungs: clear  Abdomen: soft, nontender, minimal distention, normal BS  Fundus: below umbilicus  Incision: clean, dry, intact  Lower extremeties: minimal edema no signs DVT        Assessment and Plan:  33 y.o. year-old , postoperative day 1 status-post  delivery    Continue routine Post Op care  Encourage ambulation  Regular diet    Lexi Aguila DO

## 2024-04-27 NOTE — PLAN OF CARE

## 2024-04-28 PROCEDURE — 99024 POSTOP FOLLOW-UP VISIT: CPT | Performed by: OBSTETRICS & GYNECOLOGY

## 2024-04-28 RX ADMIN — AZITHROMYCIN MONOHYDRATE 250 MG: 250 TABLET ORAL at 11:31

## 2024-04-28 RX ADMIN — ACETAMINOPHEN 650 MG: 325 TABLET, FILM COATED ORAL at 02:01

## 2024-04-28 RX ADMIN — IBUPROFEN 600 MG: 600 TABLET, FILM COATED ORAL at 02:00

## 2024-04-28 RX ADMIN — SIMETHICONE 80 MG: 80 TABLET, CHEWABLE ORAL at 08:33

## 2024-04-28 RX ADMIN — IBUPROFEN 600 MG: 600 TABLET, FILM COATED ORAL at 13:37

## 2024-04-28 RX ADMIN — LEVOTHYROXINE SODIUM 50 MCG: 50 TABLET ORAL at 05:22

## 2024-04-28 RX ADMIN — IBUPROFEN 600 MG: 600 TABLET, FILM COATED ORAL at 22:05

## 2024-04-28 RX ADMIN — DOCUSATE SODIUM 100 MG: 100 CAPSULE, LIQUID FILLED ORAL at 08:31

## 2024-04-28 RX ADMIN — ACETAMINOPHEN 650 MG: 325 TABLET, FILM COATED ORAL at 14:22

## 2024-04-28 RX ADMIN — IBUPROFEN 600 MG: 600 TABLET, FILM COATED ORAL at 08:31

## 2024-04-28 RX ADMIN — SIMETHICONE 80 MG: 80 TABLET, CHEWABLE ORAL at 02:03

## 2024-04-28 RX ADMIN — ACETAMINOPHEN 650 MG: 325 TABLET, FILM COATED ORAL at 08:31

## 2024-04-28 RX ADMIN — ACETAMINOPHEN 650 MG: 325 TABLET, FILM COATED ORAL at 22:05

## 2024-04-28 RX ADMIN — DOCUSATE SODIUM 100 MG: 100 CAPSULE, LIQUID FILLED ORAL at 18:04

## 2024-04-28 NOTE — PROGRESS NOTES
Post- Progress note    Patient is post-op day 2 from a  delivery.     Pain: controlled  Tolerating Oral Intake: yes  Voiding: yes  Flatus:yes  Ambulating: yes  Breastfeeding: well  Chest Pain: no  Shortness of Breath: no  Leg Pain/Discomfort: no  Lochia: minimal      Objective:   Vitals:    24 0740   BP: 113/86   Pulse: 58   Resp: 17   Temp: 97.6 °F (36.4 °C)   SpO2: 98%     No intake or output data in the 24 hours ending 24 1411    Physical Exam:  General: alert awake oriented  Cardiovascular: RRR  Lungs: clear  Abdomen:soft, nontender, minimal distention  Fundus: below umbilicus  Incision: clean dry intact  Lower extremeties: no evidence of DBT    Assessment and Plan:  33 y.o. year-old , postoperative day2 status-post  delivery    Continue routine post op care  Encourage ambulation  Regular diet  Plan for discharge tomorrow per patient's request    Lexi Aguila DO

## 2024-04-28 NOTE — CASE MANAGEMENT
Case Management Discharge Planning Note    Patient name Nimco Cedeno Cuff  Location LD /LD - MRN [id number]  : 1990 Date 2024       Current Admission Date: 2024  Current Admission Diagnosis:Maternal care due to low transverse uterine scar from previous  delivery   Patient Active Problem List    Diagnosis Date Noted    Sinus infection 2024    Choroid plexus cysts, fetal, affecting care of mother, antepartum 2023    Group B streptococcal carriage complicating pregnancy 10/17/2023    Maternal care due to low transverse uterine scar from previous  delivery 2023    38 weeks gestation of pregnancy 2023    Hypothyroid in pregnancy, antepartum, third trimester 2023    Pelvic organ prolapse quantification stage 1 cystocele 10/20/2021      LOS (days): 2  Geometric Mean LOS (GMLOS) (days):   Days to GMLOS:     OBJECTIVE:  Risk of Unplanned Readmission Score: 3.71         Current admission status: Inpatient   Preferred Pharmacy:   [address] [address]  Phone: [phone] Fax: [phone]    Primary Care Provider: Jake Vernon MD    Primary Insurance: BLUE CROSS  Secondary Insurance:     DISCHARGE DETAILS:     Submitted DME request to [place], waiting for doctor signature and insurance approval. Once approved, CM will have the pt's RN provide the pt with  the pump form our supply closet.

## 2024-04-28 NOTE — PLAN OF CARE

## 2024-04-28 NOTE — NURSING NOTE
Discharge teaching completed. Questions encouraged and answered. POST birth warning signs magnet provided to pt and reviewed. Pt verbalized understanding.

## 2024-04-28 NOTE — PLAN OF CARE

## 2024-04-29 VITALS
OXYGEN SATURATION: 97 % | TEMPERATURE: 97.7 F | BODY MASS INDEX: 30.97 KG/M2 | RESPIRATION RATE: 18 BRPM | HEIGHT: 64 IN | WEIGHT: 181.4 LBS | DIASTOLIC BLOOD PRESSURE: 81 MMHG | SYSTOLIC BLOOD PRESSURE: 127 MMHG | HEART RATE: 56 BPM

## 2024-04-29 PROBLEM — Z98.891 S/P REPEAT LOW TRANSVERSE C-SECTION: Status: ACTIVE | Noted: 2023-09-20

## 2024-04-29 PROCEDURE — 99024 POSTOP FOLLOW-UP VISIT: CPT | Performed by: OBSTETRICS & GYNECOLOGY

## 2024-04-29 PROCEDURE — NC001 PR NO CHARGE: Performed by: OBSTETRICS & GYNECOLOGY

## 2024-04-29 RX ORDER — ACETAMINOPHEN 325 MG/1
650 TABLET ORAL EVERY 6 HOURS SCHEDULED
Start: 2024-04-29

## 2024-04-29 RX ORDER — DOCUSATE SODIUM 100 MG/1
100 CAPSULE, LIQUID FILLED ORAL 2 TIMES DAILY
Start: 2024-04-29

## 2024-04-29 RX ORDER — IBUPROFEN 600 MG/1
600 TABLET ORAL EVERY 6 HOURS
Start: 2024-04-29

## 2024-04-29 RX ADMIN — ACETAMINOPHEN 650 MG: 325 TABLET, FILM COATED ORAL at 05:41

## 2024-04-29 RX ADMIN — DOCUSATE SODIUM 100 MG: 100 CAPSULE, LIQUID FILLED ORAL at 08:36

## 2024-04-29 RX ADMIN — IBUPROFEN 600 MG: 600 TABLET, FILM COATED ORAL at 10:47

## 2024-04-29 RX ADMIN — ACETAMINOPHEN 650 MG: 325 TABLET, FILM COATED ORAL at 10:47

## 2024-04-29 RX ADMIN — IBUPROFEN 600 MG: 600 TABLET, FILM COATED ORAL at 05:40

## 2024-04-29 RX ADMIN — LEVOTHYROXINE SODIUM 50 MCG: 50 TABLET ORAL at 05:45

## 2024-04-29 NOTE — PROGRESS NOTES
"Progress Note - OB/GYN   Nimco Good 33 y.o. female MRN: [id number]  Unit/Bed#:  205-01 Encounter: [id number]    Assessment:  Post partum Day #3 s/p scheduled RLTCS , stable, baby in room    Plan:  Continue routine post partum care   Encourage ambulation   Encourage breastfeeding   Anticipate discharge to home today  Remove steri strips in 1 week, postpartum office visit in 3 weeks     Subjective/Objective   Chief Complaint:     Post delivery. Patient is doing well. Lochia WNL. Pain well controlled without use of narcotics as long as she stays on schedule.  Has been voiding and passing flatus without concern.      Subjective:     Pain: yes,  improved with meds  Tolerating PO: yes  Voiding: yes  Flatus: yes  Ambulating: yes  Chest pain: no  Shortness of breath: no  Leg pain: no  Lochia: minimal    Objective:     Vitals: /81 (BP Location: Right arm)   Pulse 56   Temp 97.7 °F (36.5 °C) (Oral)   Resp 18   Ht 5' 4\" (1.626 m)   Wt 82.3 kg (181 lb 6.4 oz)   LMP 07/24/2023   SpO2 97%   Breastfeeding Yes   BMI 31.14 kg/m²     No intake or output data in the 24 hours ending 04/29/24 0938    Lab Results   Component Value Date    WBC 13.34 (H) 04/27/2024    HGB 12.7 04/27/2024    HCT 39.3 04/27/2024    MCV 98 04/27/2024     04/27/2024       Physical Exam:     Gen: AAOx3, NAD  Lungs: No respiratory distress  Abd: Soft, non-tender, non-distended, no rebound or guarding  Uterine fundus firm and non-tender, 3 cm below the umbilicus  Incision with old blood, no active bleeding or drainage, intact with steri strips in place.   Ext: Non tender, neg yuri's, mild edema    Clover Batista MD  4/29/2024  9:38 AM          "

## 2024-04-29 NOTE — LACTATION NOTE
This note was copied from a baby's chart.  CONSULT - LACTATION  Baby Girl (Nimco) Latisha 3 days female MRN: [id number]    [facility] hospital NURSERY Room / Bed: (N)/(N) Encounter: [id number]    Maternal Information     MOTHER:  Nimco Good  Maternal Age: 33 y.o.   OB History: # 1 - Date: 21, Sex: Male, Weight: 3833 g (8 lb 7.2 oz), GA: 40w1d, Delivery: , Low Transverse, Apgar1: None, Apgar5: None, Living: Living, Birth Comments: arrest descent after 2 hours pushing, failed attempt vacuum    # 2 - Date: 24, Sex: Female, Weight: 3260 g (7 lb 3 oz), GA: 39w4d, Delivery: , Low Transverse, Apgar1: 8, Apgar5: 9, Living: Living, Birth Comments: None   Previouse breast reduction surgery? No    Lactation history:   Has patient previously breast fed: Yes   How long had patient previously breast fed:     Previous breast feeding complications:       Past Surgical History:   Procedure Laterality Date     SECTION  2021    [place]  DELIVERY ONLY N/A 2024    Procedure:  SECTION () REPEAT;  Surgeon: Sofia Dunn MD;  Location: [facility];  Service: Obstetrics        Birth information:  YOB: 2025   Time of birth: 12:15 PM   Sex: female   Delivery type: , Low Transverse   Birth Weight: 3260 g (7 lb 3 oz)   Percent of Weight Change: -10%     Gestational Age: 20w4d   [unfilled]    Assessment     Breast and nipple assessment: normal assessment     Assessment:  cups with cheek support, brings tongue to inner gumline line.    Feeding assessment:  baby would latch deeply, begin strongly and slow down, tiring and falling asleep needed stimulation. occasional audible gulping heard with breast compression and on the right side, most productive for mother.    LATCH:  Latch: Grasps breast, tongue down, lips flanged, rhythmic sucking   Audible Swallowing: A few with stimulation   Type of Nipple:  Everted (After stimulation)   Comfort (Breast/Nipple): Soft/non-tender   Hold (Positioning): Partial assist, teach one side, mother does other, staff holds   LATCH Score: 8          Feeding recommendations:   offer both sides, using breast compression to help with flow and support for baby. Feed until fullness cues are observed. If ordered by provider, may continue to supplement with formula 10-15 ml until evaluated with pediatrician. [facility] will be called to leave contact for follow up outpatient for a tongue evaluation.    Mother received Zomee breast pump for home use. Measured nipples and provided 19 mm flanges with a coupon for purchase of more parts/accessories as needed.    Shanique Pérez RN 4/29/2024 10:34 AM

## 2024-04-29 NOTE — PLAN OF CARE

## 2024-04-29 NOTE — CASE MANAGEMENT
Case Management Progress Note    Patient name Nimco Good  Location LD /LD  MRN [id number]  : 1990 Date 2024       LOS (days): 3  Geometric Mean LOS (GMLOS) (days):   Days to GMLOS:        OBJECTIVE:        Current admission status: Inpatient  Preferred Pharmacy:   [address] [address]  Phone: [phone] Fax: [phone]    Primary Care Provider: Jake Vernon MD    Primary Insurance: BLUE CROSS  Secondary Insurance:     PROGRESS NOTE: Cm follow up on pump order consult. Order placed and approved over the weekend with CM staffing covering. [place] [facility].

## 2024-04-29 NOTE — PLAN OF CARE
Problem: POSTPARTUM  Goal: Experiences normal postpartum course  Description: INTERVENTIONS:  - Monitor maternal vital signs  - Assess uterine involution and lochia  2024 1020 by Aliya Persaud RN  Outcome: Completed  2024 08 by Aliya Persaud RN  Outcome: Progressing  Goal: Appropriate maternal -  bonding  Description: INTERVENTIONS:  - Identify family support  - Assess for appropriate maternal/infant bonding   -Encourage maternal/infant bonding opportunities  - Referral to  or  as needed  2024 1020 by Aliya Persaud RN  Outcome: Completed  2024 08 by Aliya Persaud RN  Outcome: Progressing  Goal: Establishment of infant feeding pattern  Description: INTERVENTIONS:  - Assess breast/bottle feeding  - Refer to lactation as needed  2024 1020 by Aliya Persaud RN  Outcome: Completed  2024 by Aliya Persaud RN  Outcome: Progressing  Goal: Incision(s), wounds(s) or drain site(s) healing without S/S of infection  Description: INTERVENTIONS  - Assess and document dressing, incision, wound bed, drain sites and surrounding tissue  - Provide patient and family education  2024 1020 by Aliya Persaud RN  Outcome: Completed  2024 by Aliya Persaud RN  Outcome: Progressing     Problem: PAIN - ADULT  Goal: Verbalizes/displays adequate comfort level or baseline comfort level  Description: Interventions:  - Encourage patient to monitor pain and request assistance  - Assess pain using appropriate pain scale  - Administer analgesics based on type and severity of pain and evaluate response  - Implement non-pharmacological measures as appropriate and evaluate response  - Consider cultural and social influences on pain and pain management  - Notify physician/advanced practitioner if interventions unsuccessful or patient reports new pain  2024 1020 by Aliya Persaud RN  Outcome: Completed  2024 by  Aliya Persaud RN  Outcome: Progressing     Problem: INFECTION - ADULT  Goal: Absence or prevention of progression during hospitalization  Description: INTERVENTIONS:  - Assess and monitor for signs and symptoms of infection  - Monitor lab/diagnostic results  - Monitor all insertion sites, i.e. indwelling lines, tubes, and drains  - Monitor endotracheal if appropriate and nasal secretions for changes in amount and color  - [place] appropriate cooling/warming therapies per order  - Administer medications as ordered  - Instruct and encourage patient and family to use good hand hygiene technique  - Identify and instruct in appropriate isolation precautions for identified infection/condition  4/29/2024 1020 by Aliya Persaud RN  Outcome: Completed  4/29/2024 0841 by Aliya Persaud RN  Outcome: Progressing  Goal: Absence of fever/infection during neutropenic period  Description: INTERVENTIONS:  - Monitor WBC    4/29/2024 1020 by Aliya Persaud RN  Outcome: Completed  4/29/2024 0841 by Aliya Persaud RN  Outcome: Progressing     Problem: SAFETY ADULT  Goal: Patient will remain free of falls  Description: INTERVENTIONS:  - Educate patient/family on patient safety including physical limitations  - Instruct patient to call for assistance with activity   - Consult OT/PT to assist with strengthening/mobility   - Keep Call bell within reach  - Keep bed low and locked with side rails adjusted as appropriate  - Keep care items and personal belongings within reach  - Initiate and maintain comfort rounds  - Make Fall Risk Sign visible to staff  - Apply yellow socks and bracelet for high fall risk patients  - Consider moving patient to room near nurses station  4/29/2024 1020 by Aliya Persaud RN  Outcome: Completed  4/29/2024 0841 by Aliya Persaud RN  Outcome: Progressing  Goal: Maintain or return to baseline ADL function  Description: INTERVENTIONS:  -  Assess patient's ability to carry out ADLs;  assess patient's baseline for ADL function and identify physical deficits which impact ability to perform ADLs (bathing, care of mouth/teeth, toileting, grooming, dressing, etc.)  - Assess/evaluate cause of self-care deficits   - Assess range of motion  - Assess patient's mobility; develop plan if impaired  - Assess patient's need for assistive devices and provide as appropriate  - Encourage maximum independence but intervene and supervise when necessary  - Involve family in performance of ADLs  - Assess for home care needs following discharge   - Consider OT consult to assist with ADL evaluation and planning for discharge  - Provide patient education as appropriate  4/29/2024 1020 by Aliya Persaud RN  Outcome: Completed  4/29/2024 0841 by Aliya Persaud RN  Outcome: Progressing  Goal: Maintains/Returns to pre admission functional level  Description: INTERVENTIONS:  - Perform AM-PAC 6 Click Basic Mobility/ Daily Activity assessment daily.  - Set and communicate daily mobility goal to care team and patient/family/caregiver.   - Collaborate with rehabilitation services on mobility goals if consulted  - Out of bed for toileting  - Record patient progress and toleration of activity level   4/29/2024 1020 by Aliya Persaud RN  Outcome: Completed  4/29/2024 0841 by Aliya Persaud RN  Outcome: Progressing     Problem: DISCHARGE PLANNING  Goal: Discharge to home or other facility with appropriate resources  Description: INTERVENTIONS:  - Identify barriers to discharge w/patient and caregiver  - Arrange for needed discharge resources and transportation as appropriate  - Identify discharge learning needs (meds, wound care, etc.)  - Arrange for interpretive services to assist at discharge as needed  - Refer to Case Management Department for coordinating discharge planning if the patient needs post-hospital services based on physician/advanced practitioner order or complex needs related to functional status,  cognitive ability, or social support system  4/29/2024 1020 by Aliya Persaud RN  Outcome: Completed  4/29/2024 0841 by Aliya Persaud RN  Outcome: Progressing     Problem: Knowledge Deficit  Goal: Patient/family/caregiver demonstrates understanding of disease process, treatment plan, medications, and discharge instructions  Description: Complete learning assessment and assess knowledge base.  Interventions:  - Provide teaching at level of understanding  - Provide teaching via preferred learning methods  4/29/2024 1020 by lAiya Persaud RN  Outcome: Completed  4/29/2024 0841 by Aliya Persaud RN  Outcome: Progressing

## 2024-04-29 NOTE — LACTATION NOTE
This note was copied from a baby's chart.  Met with parents to discuss feeding plan and discuss the Breastfeeding Discharge Booklet.    Baby is currently at an 11.6% weight loss ( delivery), having appropriate output for her age and 24 hour bilirubin was low.     Parents began supplementing, decided to provide formula (discussed donor as an option as well during encounter). Discussed importance of offering both breasts first and supplementing 10-15 ml of formula, but when mature milk comes in, mother may pump after feedings to provided expressed breast milk as supplement until baby begins to regain weight.    Discussed the importance of ensuring that baby feeds 8-12x in 24 hours and that baby has 6 wet diapers or more that are becoming more dilute as well as soiled diapers that are transitioning demonstrated by color change from meconium to a yellow/gold seedy loose stool by day 5.    Mother was given resources to look up medications to ensure they are safe with breastfeeding, by communicating with the [facility], [facility], [facility] as well as using [employer] (assisted mother to pin to home screen on personal phone).    Mother is aware of engorgement time frame (when mature milk comes in) and management as well as how to deal with conditions that may occur while breastfeeding (plugged ducts, milk blebs and mastitis) and when is appropriate to communicate with her OB/GYN and/or a lactation consultant.    Mother is aware and comfortable with how to set up a pump, how to cycle (stimulation vs expression phases during a pumping session), importance of flange fit and trying different sizes to ensure best fit, milk storage and how to properly clean parts. Discussed handouts for tips on pumping when returning to work and paced bottle feeding.    Addressed breast pump needs and mother discussed that she would like an Zealify breast pump for home use. Case management consult will be  placed.    Discussed community resources for continued support in breastfeeding once discharged home. She was encouraged to communicate with [facility], insurance for lactation home visits and/or with her baby's pediatrician for lactation support/services that could be offered in the practice or close to home.    Parents were encouraged to call for a latch assessment and/or further questions that arise prior to discharge.

## 2024-04-29 NOTE — DISCHARGE SUMMARY
Discharge Summary -   Nimco Good 33 y.o. female MRN: [id number]  Unit/Bed#: -01 Encounter: [id number]    Admission Date: 2024     Discharge Date: 2024    Attending: Sofia Dunn MD    Admission Diagnosis: Term pregnancy  Single fetus  GBS positive  Pregnancy complicated by: Prior     Discharge Diagnosis: Same, Delivered via RLTCS    Procedures: Repeat  section    Hospital Course:     Nimco Good is a 33 y.o.  who was initially admitted at 39.4 wks for scheduled repeat .   She delivered a viable female  on 2024 at 1215. Weight 7 lbs 3oz via repeat  section, low transverse incision.   Patient underwent normal post delivery care. She is ambulating well, voiding on her own, passing flatus, and tolerating oral intake.  Pain is well controlled with only oral analgesics.    Complications: None    Condition at discharge: good     Discharge Medications:  For a complete list of the patient's medications, please refer to her medical reconcillation report.    Discharge instructions/Information to patient and family:   See after visit summary for information provided to patient and family.      Provisions for Follow-Up Care:  See after visit summary for information related to follow-up care and any pertinent home health orders.      Disposition: See After Visit Summary for discharge disposition information.    Planned Readmission: No

## 2024-04-29 NOTE — PLAN OF CARE

## 2024-05-03 ENCOUNTER — TELEPHONE (OUTPATIENT)
Dept: OBGYN CLINIC | Facility: CLINIC | Age: 34
End: 2024-05-03

## 2024-05-03 NOTE — TELEPHONE ENCOUNTER
POSTPARTUM PHONE CALL ASSESSMENT call placed, left a message for patient to call back.     Date of Delivery: 24  Delivering Provider: Shaun   Mode: Repeat   Do you have a postpartum visit scheduled? Yes    Date scheduled: 5/15/2024  Provider: Quique

## 2024-05-06 LAB
DME PARACHUTE DELIVERY DATE ACTUAL: NORMAL
DME PARACHUTE DELIVERY DATE REQUESTED: NORMAL
DME PARACHUTE DELIVERY NOTE: NORMAL
DME PARACHUTE ITEM DESCRIPTION: NORMAL
DME PARACHUTE ORDER STATUS: NORMAL
DME PARACHUTE SUPPLIER NAME: NORMAL
DME PARACHUTE SUPPLIER PHONE: NORMAL
PLACENTA IN STORAGE: NORMAL

## 2024-05-14 PROBLEM — Z3A.38 38 WEEKS GESTATION OF PREGNANCY: Status: RESOLVED | Noted: 2023-09-20 | Resolved: 2024-05-14

## 2024-05-14 NOTE — PROGRESS NOTES
Routine Post Partum Visit  [facility] - [address], [address], [place] [address]    Assessment/Plan:  Nimco is a 33 y.o. year old  who presents for postpartum visit.    Routine Postpartum Care  Normal postpartum exam  Contraception: condoms  Depression Screen: PPD screen score: 1; negative  Feeding:  She is breast feeding exclusively.  Psychosocial support: good  Follow up in: 3 weeks for further postpartum care, or as needed.    Additional Problems:  1. Postpartum care and examination        Next visit: 3 weeks postpartum    Subjective:     CC: Postpartum visit    Nimco Good is a 33 y.o. y.o. female  who presents for a postpartum visit.     She is 3 weeks postpartum following a low cervical transverse  section on 2024 at 39 weeks.    Outcome: repeat  section, low transverse incision,   Anesthesia: spinal. Postpartum course has been uncomplicated. Baby's course has been uncomplicated. Baby is feeding by She is breast feeding exclusively..     Bleeding staining only. Bowel function is normal. Bladder function is normal. Patient is not sexually active since delivery. Contraception method is condoms. Postpartum depression screening: negative.    The following portions of the patient's history were reviewed and updated as appropriate: allergies, current medications, past family history, past medical history, obstetric history, gynecologic history, past social history, past surgical history and problem list.      Objective:  LMP 2023   Pregravid Weight/BMI: 59.9 kg (132 lb) (BMI 22.65)  Current Weight:     Total Weight Gain: 22.4 kg (49 lb 6.4 oz)     General: Well appearing, no distress.  Mood and affect: Appropriate.  Abdomen: Soft, nontender  Incision: C/D/I - healing

## 2024-05-14 NOTE — PATIENT INSTRUCTIONS
Postpartum   - bleeding may continue postpartum for up to 6-8 weeks, but overall should be decreasing.  Sometimes, even if breast feeding, your period may start about 6 weeks after delivery, some may take longer to return   - increase activity slowly as you feel ready.  No vigorous physical exercise, running/jogging, or aerobic activity until full 6 weeks postpartum.  As increasing activity - start slowly and listen to your body.     - no lifting more than the baby in car seat until 6 full weeks postpartum, especially if you had a  or vaginal stitches, unless instructed differently by your doctor.   - okay to resume intercourse after 6 full weeks following delivery, be sure to use contraception.  It is possible to get pregnant before your first period.     - for information on birth control please see website www.bedsider.org - for all things birth control   - if breast feeding/nursing, periods may not return right away.  They may return after some time or not until you completely stop nursing.  Everyone is different.    For additional information on postpartum period see the following resources below:   - [facility] [place], ph: [phone], [email or web address]-me  Lactation and mental health support   - Fourth Trimester Project - www.[employer].CiiNOW  Resource for all things postpartum   - National Suicide and Crisis Lifeline - 988  If you or someone you know is experiencing emotional distress, crisis, or having suicidal thoughts, please contact the Suicide and Crisis Lifeline  Text or call 988 from landline or cell phone for  support  Visit [employer].org to chat, button in the top right-hand corner of the screen

## 2024-05-15 ENCOUNTER — POSTPARTUM VISIT (OUTPATIENT)
Dept: OBGYN CLINIC | Facility: CLINIC | Age: 34
End: 2024-05-15

## 2024-05-15 VITALS
HEIGHT: 64 IN | BODY MASS INDEX: 27.39 KG/M2 | WEIGHT: 160.4 LBS | DIASTOLIC BLOOD PRESSURE: 80 MMHG | SYSTOLIC BLOOD PRESSURE: 130 MMHG

## 2024-05-15 PROCEDURE — 99024 POSTOP FOLLOW-UP VISIT: CPT | Performed by: OBSTETRICS & GYNECOLOGY

## 2024-05-26 PROBLEM — J32.9 SINUS INFECTION: Status: RESOLVED | Noted: 2024-04-26 | Resolved: 2024-05-26

## 2024-06-03 NOTE — PATIENT INSTRUCTIONS
Postpartum   - bleeding may continue postpartum for up to 6-8 weeks, but overall should be decreasing.  Sometimes, even if breast feeding, your period may start about 6 weeks after delivery, some may take longer to return   - increase activity slowly as you feel ready.  No vigorous physical exercise, running/jogging, or aerobic activity until full 6 weeks postpartum.  As increasing activity - start slowly and listen to your body.     - no lifting more than the baby in car seat until 6 full weeks postpartum, especially if you had a  or vaginal stitches, unless instructed differently by your doctor.   - okay to resume intercourse after 6 full weeks following delivery, be sure to use contraception.  It is possible to get pregnant before your first period.     - for information on birth control please see website www.bedsider.org - for all things birth control   - if breast feeding/nursing, periods may not return right away.  They may return after some time or not until you completely stop nursing.  Everyone is different.    For additional information on postpartum period see the following resources below:   - [facility] [place], ph: [phone], [email or web address]-[email or web address]-me  Lactation and mental health support   - Fourth Trimester Project - www.[employer].Vendormate  Resource for all things postpartum   - National Suicide and Crisis Lifeline - 988  If you or someone you know is experiencing emotional distress, crisis, or having suicidal thoughts, please contact the Suicide and Crisis Lifeline  Text or call 988 from landline or cell phone for  support  Visit [employer].org to chat, button in the top right-hand corner of the screen

## 2024-06-03 NOTE — PROGRESS NOTES
"Routine Post Partum Visit  [facility] - [address], [address], [place] [address]    Assessment/Plan:  Nimco is a 33 y.o. year old  who presents for postpartum visit.    Routine Postpartum Care  Normal postpartum exam  Contraception: condoms  Depression Screen: PPD screen score: 1; negative  Feeding:  She is breast feeding exclusively.  Psychosocial support: good  Cervical cancer screening Up to Date, next due 2 years  Follow up in: 3 months for wellness visit, or as needed.    Additional Problems:  1. Postpartum care and examination  2. History of cystocele  Assessment & Plan:  Had seen pelvic floor PT for symptomatic cystocele prior to this pregnancy.  Exam normal today.  Declines issues.  Offered referral to restart pelvic floor PT in PP period - she declines at this time.      Next visit: 3 months Wellness    Subjective:     CC: Postpartum visit    Nimco Good is a 33 y.o. y.o. female  who presents for a postpartum visit.     She is  5 and 1/2  week postpartum following a low cervical transverse  section on 2024 at 39 weeks.    Outcome: repeat  section, low transverse incision,   Anesthesia: spinal. Postpartum course has been uncomplicated. Baby's course has been uncomplicated. Baby is feeding by She is breast feeding exclusively..     Bleeding staining only. Bowel function is normal. Bladder function is normal. Patient is not sexually active since delivery. Contraception method is condoms. Postpartum depression screening: negative.    The following portions of the patient's history were reviewed and updated as appropriate: allergies, current medications, past family history, past medical history, obstetric history, gynecologic history, past social history, past surgical history and problem list.      Objective:  BP 90/68   Ht 5' 4\" (1.626 m)   Wt 71.2 kg (157 lb)   LMP 2023 (Exact Date)   Breastfeeding Yes   BMI 26.95 kg/m² "   Pregravid Weight/BMI: 59.9 kg (132 lb) (BMI 22.65)  Current Weight: 71.2 kg (157 lb)   Total Weight Gain: 22.4 kg (49 lb 6.4 oz)     General: Well appearing, no distress.  Mood and affect: Appropriate.  Abdomen: Soft, nontender  Incision: healing well  Vulva: normal  Vagina: normal, no abnormal discharge  Cervix: closed, no bleeding  Uterus: non-tender, normal size  Adnexa: no masses, no tenderness

## 2024-06-04 ENCOUNTER — POSTPARTUM VISIT (OUTPATIENT)
Dept: OBGYN CLINIC | Facility: CLINIC | Age: 34
End: 2024-06-04

## 2024-06-04 VITALS
HEIGHT: 64 IN | BODY MASS INDEX: 26.8 KG/M2 | SYSTOLIC BLOOD PRESSURE: 90 MMHG | WEIGHT: 157 LBS | DIASTOLIC BLOOD PRESSURE: 68 MMHG

## 2024-06-04 DIAGNOSIS — Z87.448 HISTORY OF CYSTOCELE: ICD-10-CM

## 2024-06-04 PROCEDURE — 99024 POSTOP FOLLOW-UP VISIT: CPT | Performed by: OBSTETRICS & GYNECOLOGY

## 2024-06-04 NOTE — ASSESSMENT & PLAN NOTE
Had seen pelvic floor PT for symptomatic cystocele prior to this pregnancy.  Exam normal today.  Declines issues.  Offered referral to restart pelvic floor PT in PP period - she declines at this time.   Saucerization Excision Additional Text (Leave Blank If You Do Not Want): The margin was drawn around the clinically apparent lesion.  Incisions were then made along these lines, in a tangential fashion, to the appropriate tissue plane and the lesion was extirpated.

## 2024-10-02 ENCOUNTER — ANNUAL EXAM (OUTPATIENT)
Dept: OBGYN CLINIC | Facility: CLINIC | Age: 34
End: 2024-10-02
Payer: COMMERCIAL

## 2024-10-02 VITALS
HEIGHT: 64 IN | DIASTOLIC BLOOD PRESSURE: 74 MMHG | WEIGHT: 153 LBS | SYSTOLIC BLOOD PRESSURE: 110 MMHG | BODY MASS INDEX: 26.12 KG/M2

## 2024-10-02 DIAGNOSIS — Z23 NEED FOR INFLUENZA VACCINATION: ICD-10-CM

## 2024-10-02 DIAGNOSIS — Z01.419 ENCOUNTER FOR ANNUAL ROUTINE GYNECOLOGICAL EXAMINATION: Primary | ICD-10-CM

## 2024-10-02 DIAGNOSIS — Z87.448 HISTORY OF CYSTOCELE: ICD-10-CM

## 2024-10-02 PROCEDURE — S0612 ANNUAL GYNECOLOGICAL EXAMINA: HCPCS | Performed by: OBSTETRICS & GYNECOLOGY

## 2024-10-02 PROCEDURE — 90471 IMMUNIZATION ADMIN: CPT | Performed by: OBSTETRICS & GYNECOLOGY

## 2024-10-02 PROCEDURE — 90656 IIV3 VACC NO PRSV 0.5 ML IM: CPT | Performed by: OBSTETRICS & GYNECOLOGY

## 2024-10-02 NOTE — LETTER
2024     Jake Vernon MD  [address] [place] [id number]    Patient: Nimco Good   YOB: 1990   Date of Visit: 10/2/2024       Dear Dr. Vernon:    Thank you for referring Nimco Good to me for evaluation. Below are my notes for this consultation.    If you have questions, please do not hesitate to call me. I look forward to following your patient along with you.         Sincerely,        Sofia Dunn MD        CC: No Recipients    Sofia Dunn MD  10/2/2024 10:53 AM  Sign when Signing Visit  Annual Wellness Visit  [facility] - [address], [address], [place] [address]    ASSESSMENT/PLAN: Nimco Good is a 34 y.o.  who presents for annual gynecologic exam.    Encounter for routine gynecologic examination  - Routine well woman exam completed today.  - Cervical Cancer Screening: Current ASCCP Guidelines reviewed. Last Pap: 2023 normal.  Past abnormal pap: none.  Next Pap Due: 2 years.  - HPV Vaccination status: Immunization series complete  - STI screening offered including HIV testing: offered, pt declined  - Contraceptive counseling discussed.  Current contraception:   - The following were reviewed in today's visit: family planning choices, exercise, and healthy diet    Additional problems addressed during this visit:  1. Encounter for annual routine gynecological examination  2. Need for influenza vaccination  -     influenza vaccine preservative-free 0.5 mL IM (Fluzone, Afluria, Fluarix, Flulaval)  3. History of cystocele  Assessment & Plan:  H/o mild cystocele.  Minimal on exam.  Pt doing exercises she learned at PT in past.  Offered referral if she feels wishes to do PT again.  Declines currently.      Next visit: 1 year Wellness      CC:  Annual Gynecologic Examination    HPI: Nimco Good is a 34 y.o.  who presents for annual gynecologic examination.  She denies any breast, urinary or pelvic issues at  today's visit.    Nursing since delivery.  No menses.  Sexually active, no new partners.  Condoms for contraception.    Pt reports completed Gardasil vaccine in teens.        Gyn History  No LMP recorded.  Nursing.     Last Pap: 2023 was normal    She  reports being sexually active and has had partner(s) who are male. She reports using the following method of birth control/protection: Condom.       OB History      Past Medical History:  No date: Hypothyroid      Comment:  Follows Dr. Phillips/Aile[facility]  No date: IBS (irritable bowel syndrome)     Past Surgical History:  2021:  SECTION  2024: [facility]  DELIVERY ONLY; N/A      Comment:  Procedure:  SECTION () REPEAT;                 Surgeon: Sofia Dunn MD;  Location: [facility];  Service:                Obstetrics     Family History   Problem Relation Age of Onset   • Hypertension Mother    • Thyroid disease Mother    • Melanoma Father    • No Known Problems Brother    • No Known Problems Son    • Colon cancer Maternal Grandmother    • Coronary artery disease Maternal Grandfather    • Brain cancer Paternal Grandmother    • Dementia Paternal Grandfather    • Ovarian cancer Paternal Aunt 40   • Diabetes Paternal Aunt    • Breast cancer Neg Hx    • Uterine cancer Neg Hx         Social History     Tobacco Use   • Smoking status: Former     Current packs/day: 0.00     Types: Cigarettes     Quit date:      Years since quitting: 10.7     Passive exposure: Never   • Smokeless tobacco: Never   Vaping Use   • Vaping status: Never Used   Substance Use Topics   • Alcohol use: Yes     Comment: socially   • Drug use: Never     Comment: Denies          Current Outpatient Medications:   •  Levothyroxine Sodium 50 MCG CAPS, Take 1 tablet by mouth daily Taking 50 mcg daily, Disp: , Rfl:   •  Prenatal Vit-Fe Sulfate-FA-DHA (PRENATAL VITAMIN/MIN +DHA PO), Take by mouth, Disp: , Rfl:     She is allergic to lactose - food  "allergy..    ROS negative except as noted in HPI    Objective:  /74 (BP Location: Left arm, Patient Position: Sitting, Cuff Size: Standard)   Ht 5' 4\" (1.626 m)   Wt 69.4 kg (153 lb)   Breastfeeding Yes   BMI 26.26 kg/m²      Physical Exam  Constitutional:       Appearance: Normal appearance.   Chest:   Breasts:     Right: Normal. No mass or tenderness.      Left: Normal. No mass or tenderness.   Abdominal:      Palpations: Abdomen is soft.      Tenderness: There is no abdominal tenderness.   Genitourinary:     General: Normal vulva.      Vagina: No bleeding or lesions.      Cervix: Normal.      Uterus: Normal. Not tender.       Adnexa:         Right: No mass or tenderness.          Left: No mass or tenderness.        Rectum: No external hemorrhoid.   Musculoskeletal:         General: Normal range of motion.   Lymphadenopathy:      Upper Body:      Right upper body: No axillary adenopathy.      Left upper body: No axillary adenopathy.   Neurological:      Mental Status: She is alert and oriented to person, place, and time.   Psychiatric:         Mood and Affect: Mood normal.         Behavior: Behavior normal.         "

## 2024-10-02 NOTE — ASSESSMENT & PLAN NOTE
H/o mild cystocele.  Minimal on exam.  Pt doing exercises she learned at PT in past.  Offered referral if she feels wishes to do PT again.  Declines currently.

## 2024-10-02 NOTE — PROGRESS NOTES
Annual Wellness Visit  [facility] - [address], [address], [place] [address]    ASSESSMENT/PLAN: Nimco Good is a 34 y.o.  who presents for annual gynecologic exam.    Encounter for routine gynecologic examination  - Routine well woman exam completed today.  - Cervical Cancer Screening: Current ASCCP Guidelines reviewed. Last Pap: 2023 normal.  Past abnormal pap: none.  Next Pap Due: 2 years.  - HPV Vaccination status: Immunization series complete  - STI screening offered including HIV testing: offered, pt declined  - Contraceptive counseling discussed.  Current contraception:   - The following were reviewed in today's visit: family planning choices, exercise, and healthy diet    Additional problems addressed during this visit:  1. Encounter for annual routine gynecological examination  2. Need for influenza vaccination  -     influenza vaccine preservative-free 0.5 mL IM (Fluzone, Afluria, Fluarix, Flulaval)  3. History of cystocele  Assessment & Plan:  H/o mild cystocele.  Minimal on exam.  Pt doing exercises she learned at PT in past.  Offered referral if she feels wishes to do PT again.  Declines currently.      Next visit: 1 year Wellness      CC:  Annual Gynecologic Examination    HPI: Nimco Good is a 34 y.o.  who presents for annual gynecologic examination.  She denies any breast, urinary or pelvic issues at today's visit.    Nursing since delivery.  No menses.  Sexually active, no new partners.  Condoms for contraception.    Pt reports completed Gardasil vaccine in teens.        Gyn History  No LMP recorded.  Nursing.     Last Pap: 2023 was normal    She  reports being sexually active and has had partner(s) who are male. She reports using the following method of birth control/protection: Condom.       OB History      Past Medical History:  No date: Hypothyroid      Comment:  Follows Dr. Phillips/Aileen  No date: IBS (irritable  "bowel syndrome)     Past Surgical History:  2021:  SECTION  2024: MS  DELIVERY ONLY; N/A      Comment:  Procedure:  SECTION () REPEAT;                 Surgeon: Sofia Dunn MD;  Location: [facility];  Service:                Obstetrics     Family History   Problem Relation Age of Onset    Hypertension Mother     Thyroid disease Mother     Melanoma Father     No Known Problems Brother     No Known Problems Son     Colon cancer Maternal Grandmother     Coronary artery disease Maternal Grandfather     Brain cancer Paternal Grandmother     Dementia Paternal Grandfather     Ovarian cancer Paternal Aunt 40    Diabetes Paternal Aunt     Breast cancer Neg Hx     Uterine cancer Neg Hx         Social History     Tobacco Use    Smoking status: Former     Current packs/day: 0.00     Types: Cigarettes     Quit date:      Years since quitting: 10.7     Passive exposure: Never    Smokeless tobacco: Never   Vaping Use    Vaping status: Never Used   Substance Use Topics    Alcohol use: Yes     Comment: socially    Drug use: Never     Comment: Denies          Current Outpatient Medications:     Levothyroxine Sodium 50 MCG CAPS, Take 1 tablet by mouth daily Taking 50 mcg daily, Disp: , Rfl:     Prenatal Vit-Fe Sulfate-FA-DHA (PRENATAL VITAMIN/MIN +DHA PO), Take by mouth, Disp: , Rfl:     She is allergic to lactose - food allergy..    ROS negative except as noted in HPI    Objective:  /74 (BP Location: Left arm, Patient Position: Sitting, Cuff Size: Standard)   Ht 5' 4\" (1.626 m)   Wt 69.4 kg (153 lb)   Breastfeeding Yes   BMI 26.26 kg/m²      Physical Exam  Constitutional:       Appearance: Normal appearance.   Chest:   Breasts:     Right: Normal. No mass or tenderness.      Left: Normal. No mass or tenderness.   Abdominal:      Palpations: Abdomen is soft.      Tenderness: There is no abdominal tenderness.   Genitourinary:     General: Normal vulva.      Vagina: No bleeding or " lesions.      Cervix: Normal.      Uterus: Normal. Not tender.       Adnexa:         Right: No mass or tenderness.          Left: No mass or tenderness.        Rectum: No external hemorrhoid.   Musculoskeletal:         General: Normal range of motion.   Lymphadenopathy:      Upper Body:      Right upper body: No axillary adenopathy.      Left upper body: No axillary adenopathy.   Neurological:      Mental Status: She is alert and oriented to person, place, and time.   Psychiatric:         Mood and Affect: Mood normal.         Behavior: Behavior normal.

## 2025-07-15 ENCOUNTER — OFFICE VISIT (OUTPATIENT)
Dept: OBGYN CLINIC | Facility: CLINIC | Age: 35
End: 2025-07-15
Payer: COMMERCIAL

## 2025-07-15 VITALS
BODY MASS INDEX: 24.07 KG/M2 | WEIGHT: 141 LBS | HEIGHT: 64 IN | DIASTOLIC BLOOD PRESSURE: 68 MMHG | SYSTOLIC BLOOD PRESSURE: 106 MMHG

## 2025-07-15 DIAGNOSIS — N63.12 MASS OF UPPER INNER QUADRANT OF RIGHT BREAST: Primary | ICD-10-CM

## 2025-07-15 PROCEDURE — 99213 OFFICE O/P EST LOW 20 MIN: CPT | Performed by: OBSTETRICS & GYNECOLOGY

## 2025-08-01 ENCOUNTER — HOSPITAL ENCOUNTER (OUTPATIENT)
Age: 35
Discharge: HOME | End: 2025-08-01
Attending: OBSTETRICS & GYNECOLOGY
Payer: COMMERCIAL

## 2025-08-01 DIAGNOSIS — N63.12 MASS OF UPPER INNER QUADRANT OF RIGHT BREAST: ICD-10-CM

## 2025-08-01 PROCEDURE — 76642 ULTRASOUND BREAST LIMITED: CPT

## 2025-08-01 PROCEDURE — 77066 DX MAMMO INCL CAD BI: CPT

## 2025-08-01 PROCEDURE — G0279 TOMOSYNTHESIS, MAMMO: HCPCS

## (undated) DEVICE — ABDOMINAL PAD: Brand: DERMACEA

## (undated) DEVICE — PACK C-SECTION PBDS

## (undated) DEVICE — TELFA NON-ADHERENT ABSORBENT DRESSING: Brand: TELFA

## (undated) DEVICE — SUT VICRYL 2-0 SH 27 IN UNDYED J417H

## (undated) DEVICE — GLOVE INDICATOR PI UNDERGLOVE SZ 7.5 BLUE

## (undated) DEVICE — MEDI-VAC YANKAUER SUCTION HANDLE W/STRAIGHT TIP & CONTROL VENT: Brand: CARDINAL HEALTH

## (undated) DEVICE — GLOVE PI ULTRA TOUCH SZ.7.5

## (undated) DEVICE — SUT VICRYL 0 CT-1 27 IN J260H

## (undated) DEVICE — DRESSING MEPILEX AG BORDER 4 X 12 IN

## (undated) DEVICE — 3M™ STERI-STRIP™ REINFORCED ADHESIVE SKIN CLOSURES, R1547, 1/2 IN X 4 IN (12 MM X 100 MM), 6 STRIPS/ENVELOPE: Brand: 3M™ STERI-STRIP™

## (undated) DEVICE — SUT PLAIN 2-0 CTX 27 IN 872H

## (undated) DEVICE — Device

## (undated) DEVICE — SKIN MARKER DUAL TIP WITH RULER CAP, FLEXIBLE RULER AND LABELS: Brand: DEVON

## (undated) DEVICE — CHLORAPREP HI-LITE 26ML ORANGE

## (undated) DEVICE — DRESSING MEPILEX AG BORDER 4 X 8 IN